# Patient Record
Sex: FEMALE | Race: BLACK OR AFRICAN AMERICAN | NOT HISPANIC OR LATINO | Employment: OTHER | URBAN - METROPOLITAN AREA
[De-identification: names, ages, dates, MRNs, and addresses within clinical notes are randomized per-mention and may not be internally consistent; named-entity substitution may affect disease eponyms.]

---

## 2017-01-17 ENCOUNTER — TRANSFERRED RECORDS (OUTPATIENT)
Dept: HEALTH INFORMATION MANAGEMENT | Facility: CLINIC | Age: 82
End: 2017-01-17

## 2017-03-24 DIAGNOSIS — I10 HYPERTENSION GOAL BP (BLOOD PRESSURE) < 140/90: ICD-10-CM

## 2017-03-24 RX ORDER — LISINOPRIL 10 MG/1
10 TABLET ORAL DAILY
Qty: 30 TABLET | Refills: 0 | Status: SHIPPED | OUTPATIENT
Start: 2017-03-24 | End: 2017-04-23

## 2017-03-24 NOTE — TELEPHONE ENCOUNTER
Reason for Call:  Medication or medication refill:    Do you use a [facility]?  Name of the pharmacy and phone number for the current request:  [place] [id number]    Name of the medication requested: lisinopril (PRINIVIL,ZESTRIL) 10 MG tablet    Other request: Pt is completely out of this Rx and needs it right away. States that she has been talking to the pharmacy about the refill, last office visit was 02/16/2016. Please give her a call if you have any questions, thank you.     Can we leave a detailed message on this number? YES    Phone number patient can be reached at: Home number on file [phone] (home)    Best Time: anytime    Call taken on 3/24/2017 at 3:55 PM by Jeni Mane

## 2017-04-12 DIAGNOSIS — I10 HYPERTENSION GOAL BP (BLOOD PRESSURE) < 140/90: ICD-10-CM

## 2017-04-12 NOTE — TELEPHONE ENCOUNTER
The patient will run out of her medication 4/16/17.  The patient is scheduled to see Dr. Ramirez 4/25/17.

## 2017-04-13 DIAGNOSIS — I10 HYPERTENSION GOAL BP (BLOOD PRESSURE) < 140/90: ICD-10-CM

## 2017-04-13 NOTE — TELEPHONE ENCOUNTER
hydrochlorothiazide (HYDRODIURIL) 25 MG tablet      Last Written Prescription Date: 3/16/17  Last Fill Quantity: 30, # refills: 0  Last Office Visit with G, P or [facility] prescribing provider: 2/23/16  Next 5 appointments (look out 90 days)     Apr 25, 2017  1:20 PM CDT   PHYSICAL with Edelmira Ramirez MD   [facility]    [facility] [address] [place] [id number] [phone]                   Potassium   Date Value Ref Range Status   02/23/2016 3.5 3.4 - 5.3 mmol/L Final     Creatinine   Date Value Ref Range Status   02/23/2016 0.86 0.52 - 1.04 mg/dL Final     BP Readings from Last 3 Encounters:   02/23/16 140/82   02/19/15 116/62   02/05/15 126/74

## 2017-04-17 RX ORDER — HYDROCHLOROTHIAZIDE 25 MG/1
25 TABLET ORAL DAILY
Qty: 30 TABLET | Refills: 0 | Status: SHIPPED | OUTPATIENT
Start: 2017-04-17 | End: 2017-04-18

## 2017-04-18 RX ORDER — HYDROCHLOROTHIAZIDE 25 MG/1
TABLET ORAL
Qty: 90 TABLET | Refills: 2 | Status: SHIPPED | OUTPATIENT
Start: 2017-04-18 | End: 2017-04-25

## 2017-04-18 NOTE — TELEPHONE ENCOUNTER
Prescription approved per [facility] Refill [facility].  GAETANO Valenzuela

## 2017-04-23 DIAGNOSIS — I10 HYPERTENSION GOAL BP (BLOOD PRESSURE) < 140/90: ICD-10-CM

## 2017-04-24 NOTE — TELEPHONE ENCOUNTER
Lisinopril      Last Written Prescription Date: 3/24/17  Last Fill Quantity: 30, # refills: 0  Last Office Visit with [facility], P or [facility] prescribing provider: 2/23/16  Next 5 appointments (look out 90 days)     Apr 25, 2017  1:20 PM CDT   PHYSICAL with Edelmira Ramirez MD   [facility] ([facility])    [address] [place] [id number] [phone]               RN unable to refill, patient has already received julia refill for 30 days  Has appontment with provider scheduled for tomorrow.  Nicole Fernández RN    Potassium   Date Value Ref Range Status   02/23/2016 3.5 3.4 - 5.3 mmol/L Final     Creatinine   Date Value Ref Range Status   02/23/2016 0.86 0.52 - 1.04 mg/dL Final     BP Readings from Last 3 Encounters:   02/23/16 140/82   02/19/15 116/62   02/05/15 126/74

## 2017-04-25 ENCOUNTER — OFFICE VISIT (OUTPATIENT)
Dept: FAMILY MEDICINE | Facility: CLINIC | Age: 82
End: 2017-04-25
Payer: COMMERCIAL

## 2017-04-25 VITALS
RESPIRATION RATE: 14 BRPM | OXYGEN SATURATION: 98 % | SYSTOLIC BLOOD PRESSURE: 136 MMHG | HEIGHT: 69 IN | DIASTOLIC BLOOD PRESSURE: 84 MMHG | BODY MASS INDEX: 30.96 KG/M2 | HEART RATE: 71 BPM | TEMPERATURE: 99.1 F | WEIGHT: 209 LBS

## 2017-04-25 DIAGNOSIS — R73.02 IMPAIRED GLUCOSE TOLERANCE: ICD-10-CM

## 2017-04-25 DIAGNOSIS — I10 HYPERTENSION GOAL BP (BLOOD PRESSURE) < 140/90: ICD-10-CM

## 2017-04-25 DIAGNOSIS — Z23 ENCOUNTER FOR IMMUNIZATION: ICD-10-CM

## 2017-04-25 DIAGNOSIS — E78.5 HYPERLIPIDEMIA LDL GOAL <130: ICD-10-CM

## 2017-04-25 DIAGNOSIS — Z12.31 ENCOUNTER FOR SCREENING MAMMOGRAM FOR BREAST CANCER: ICD-10-CM

## 2017-04-25 DIAGNOSIS — Z00.00 MEDICARE ANNUAL WELLNESS VISIT, SUBSEQUENT: Primary | ICD-10-CM

## 2017-04-25 LAB — HBA1C MFR BLD: 5.2 % (ref 4.3–6)

## 2017-04-25 PROCEDURE — 99397 PER PM REEVAL EST PAT 65+ YR: CPT | Mod: 25 | Performed by: FAMILY MEDICINE

## 2017-04-25 PROCEDURE — 83036 HEMOGLOBIN GLYCOSYLATED A1C: CPT | Performed by: FAMILY MEDICINE

## 2017-04-25 PROCEDURE — 80048 BASIC METABOLIC PNL TOTAL CA: CPT | Performed by: FAMILY MEDICINE

## 2017-04-25 PROCEDURE — 80061 LIPID PANEL: CPT | Performed by: FAMILY MEDICINE

## 2017-04-25 PROCEDURE — 90471 IMMUNIZATION ADMIN: CPT | Performed by: FAMILY MEDICINE

## 2017-04-25 PROCEDURE — 90736 HZV VACCINE LIVE SUBQ: CPT | Performed by: FAMILY MEDICINE

## 2017-04-25 PROCEDURE — 36415 COLL VENOUS BLD VENIPUNCTURE: CPT | Performed by: FAMILY MEDICINE

## 2017-04-25 RX ORDER — HYDROCHLOROTHIAZIDE 25 MG/1
25 TABLET ORAL DAILY
Qty: 90 TABLET | Refills: 3 | Status: SHIPPED | OUTPATIENT
Start: 2017-04-25 | End: 2017-07-11 | Stop reason: DRUGHIGH

## 2017-04-25 RX ORDER — LISINOPRIL 10 MG/1
TABLET ORAL
Qty: 30 TABLET | Refills: 0 | Status: SHIPPED | OUTPATIENT
Start: 2017-04-25 | End: 2017-04-25

## 2017-04-25 RX ORDER — LISINOPRIL 10 MG/1
10 TABLET ORAL DAILY
Qty: 90 TABLET | Refills: 3 | Status: SHIPPED | OUTPATIENT
Start: 2017-04-25 | End: 2017-07-11 | Stop reason: DRUGHIGH

## 2017-04-25 NOTE — NURSING NOTE
"Chief Complaint   Patient presents with     Medicare Visit       Initial /78  Pulse 71  Temp 99.1  F (37.3  C) (Oral)  Resp 14  Ht 5' 9.25\" (1.759 m)  Wt 209 lb (94.8 kg)  LMP 07/05/1990  SpO2 98%  BMI 30.64 kg/m2 Estimated body mass index is 30.64 kg/(m^2) as calculated from the following:    Height as of this encounter: 5' 9.25\" (1.759 m).    Weight as of this encounter: 209 lb (94.8 kg).  Medication Reconciliation: complete     Lisbeth Verdin MA     "

## 2017-04-25 NOTE — PATIENT INSTRUCTIONS
1. Schedule a mammogram.     Preventive Health Recommendations    Female Ages 65 +    Yearly exam:     See your health care provider every year in order to  o Review health changes.   o Discuss preventive care.    o Review your medicines if your doctor has prescribed any.      You no longer need a yearly Pap test unless you've had an abnormal Pap test in the past 10 years. If you have vaginal symptoms, such as bleeding or discharge, be sure to talk with your provider about a Pap test.      Every 1 to 2 years, have a mammogram.  If you are over 69, talk with your health care provider about whether or not you want to continue having screening mammograms.      Every 10 years, have a colonoscopy. Or, have a yearly FIT test (stool test). These exams will check for colon cancer.       Have a cholesterol test every 5 years, or more often if your doctor advises it.       Have a diabetes test (fasting glucose) every three years. If you are at risk for diabetes, you should have this test more often.       At age 65, have a bone density scan (DEXA) to check for osteoporosis (brittle bone disease).    Shots:    Get a flu shot each year.    Get a tetanus shot every 10 years.    Talk to your doctor about your pneumonia vaccines. There are now two you should receive - Pneumovax (PPSV 23) and Prevnar (PCV 13).    Talk to your doctor about the shingles vaccine.    Talk to your doctor about the hepatitis B vaccine.    Nutrition:     Eat at least 5 servings of fruits and vegetables each day.      Eat whole-grain bread, whole-wheat pasta and brown rice instead of white grains and rice.      Talk to your provider about Calcium and Vitamin D.     Lifestyle    Exercise at least 150 minutes a week (30 minutes a day, 5 days a week). This will help you control your weight and prevent disease.      Limit alcohol to one drink per day.      No smoking.       Wear sunscreen to prevent skin cancer.       See your dentist twice a year for an exam  and cleaning.      See your eye doctor every 1 to 2 years to screen for conditions such as glaucoma, macular degeneration and cataracts.  Preventive Health Recommendations    Female Ages 65 +    Yearly exam:   See your health care provider every year in order to  Review health changes.   Discuss preventive care.    Review your medicines if your doctor has prescribed any.    You no longer need a yearly Pap test unless you've had an abnormal Pap test in the past 10 years. If you have vaginal symptoms, such as bleeding or discharge, be sure to talk with your provider about a Pap test.    Every 1 to 2 years, have a mammogram.  If you are over 69, talk with your health care provider about whether or not you want to continue having screening mammograms.    Every 10 years, have a colonoscopy. Or, have a yearly FIT test (stool test). These exams will check for colon cancer.     Have a cholesterol test every 5 years, or more often if your doctor advises it.     Have a diabetes test (fasting glucose) every three years. If you are at risk for diabetes, you should have this test more often.     At age 65, have a bone density scan (DEXA) to check for osteoporosis (brittle bone disease).    Shots:  Get a flu shot each year.  Get a tetanus shot every 10 years.  Talk to your doctor about your pneumonia vaccines. There are now two you should receive - Pneumovax (PPSV 23) and Prevnar (PCV 13).  Talk to your doctor about the shingles vaccine.  Talk to your doctor about the hepatitis B vaccine.    Nutrition:   Eat at least 5 servings of fruits and vegetables each day.    Eat whole-grain bread, whole-wheat pasta and brown rice instead of white grains and rice.    Talk to your provider about Calcium and Vitamin D.     Lifestyle  Exercise at least 150 minutes a week (30 minutes a day, 5 days a week). This will help you control your weight and prevent disease.    Limit alcohol to one drink per day.    No smoking.     Wear sunscreen to  prevent skin cancer.     See your dentist twice a year for an exam and cleaning.    See your eye doctor every 1 to 2 years to screen for conditions such as glaucoma, macular degeneration and cataracts.

## 2017-04-25 NOTE — PROGRESS NOTES
SUBJECTIVE:                                                            Mya Mack is a 81 year old female who presents for Preventive Visit.  Are you in the first 12 months of your Medicare Part B coverage?  No    Healthy Habits:    Do you get at least three servings of calcium containing foods daily (dairy, green leafy vegetables, etc.)? yes    Amount of exercise or daily activities, outside of work: 2 day(s) per week    Problems taking medications regularly No    Medication side effects: No    Have you had an eye exam in the past two years? yes    Do you see a dentist twice per year? yes    Do you have sleep apnea, excessive snoring or daytime drowsiness?no    COGNITIVE SCREEN  1) Repeat 3 items (Banana, Sunrise, Chair)    2) Clock draw: NORMAL  3) 3 item recall: Recalls 1 object   Results: NORMAL clock, 1-2 items recalled: COGNITIVE IMPAIRMENT LESS LIKELY  Mini-CogTM Copyright S Ramon. Licensed by the author for use in [facility]; reprinted with permission (shankar@[facility]). All rights reserved.        She is having difficulties with short term memory.  Patient is writing things down on a daily basis so she remembers.     She continues to see ortho and might be looking at knee replacement in the future. She denies leg pain and is doing PT like exercises at home twice a week to help strengthen and delay surgery.      Reviewed and updated as needed this visit by clinical staff  Reviewed and updated as needed this visit by Provider    Social History   Substance Use Topics     Smoking status: Never Smoker     Smokeless tobacco: Never Used     Alcohol use Yes      Comment: 0-1 drinks per week     The patient does not drink >3 drinks per day nor >7 drinks per week.    Today's PHQ-2 Score:   PHQ-2 ( 1999 Pfizer) 2/5/2015 9/23/2014   Q1: Little interest or pleasure in doing things 0 0   Q2: Feeling down, depressed or hopeless 0 0   PHQ-2 Score 0 0     Do you feel safe in your environment - Yes    Do  you have a Health Care Directive?: No: Advance care planning reviewed with patient; information given to patient to review.    Current providers sharing in care for this patient include:   Patient Care Team:  Edelmira Ramirez MD as PCP - General (Family Practice)      Hearing impairment: No    Ability to successfully perform activities of daily living: Yes, no assistance needed     Fall risk:  Fallen 2 or more times in the past year?: No  Any fall with injury in the past year?: No    Home safety:  none identified    The following health maintenance items are reviewed in Epic and correct as of today:  Health Maintenance   Topic Date Due     BMP Q6 MOS (NO INBASKET)  08/23/2016     FALL RISK ASSESSMENT  02/23/2017     A1C Q1 YR (NO INBASKET)  02/23/2017     INFLUENZA VACCINE (SYSTEM ASSIGNED)  09/01/2017     ADVANCE DIRECTIVE PLANNING Q5 YRS (NO INBASKET)  12/28/2017     TETANUS IMMUNIZATION (SYSTEM ASSIGNED)  07/05/2020     LIPID SCREEN Q5 YR FEMALE (SYSTEM ASSIGNED)  02/23/2021     DEXA SCAN SCREENING (SYSTEM ASSIGNED)  Completed     PNEUMOCOCCAL  Completed     Pneumonia Vaccine: COMPLETED   Mammogram Screening: Patient over 75, has elected to continue with yearly mammograms.   History of abnormal Pap smear: NO - age 65 - see link Cervical Cytology Screening Guidelines    ROS:   ROS: 10 point ROS neg other than the symptoms noted above in the HPI.    This document serves as a record of the services and decisions personally performed and made by Edelmira Ramirez MD. It was created on his/her behalf by Kylee Guadalupe, trained medical scribe. The creation of this document is based the provider's statements to the medical scribes.    Scribe Kylee Guadalupe, April 25, 2017    BP Readings from Last 3 Encounters:   04/25/17 148/78   02/23/16 140/82   02/19/15 116/62    Wt Readings from Last 3 Encounters:   04/25/17 94.8 kg (209 lb)   02/23/16 102.3 kg (225 lb 8 oz)   02/19/15 108.4 kg (239 lb)        Patient Active Problem  List   Diagnosis     Obesity     Impaired glucose tolerance     Hypertension goal BP (blood pressure) < 140/90     Hyperlipidemia LDL goal <130     Advanced directives, counseling/discussion     Primary osteoarthritis of right knee     Past Surgical History:   Procedure Laterality Date     C NONSPECIFIC PROCEDURE  2003    flex sig     COLONOSCOPY  2010    due 2013     HYSTERECTOMY, PAP NO LONGER INDICATED       SURGICAL HISTORY OF -       CARMENZA       Social History   Substance Use Topics     Smoking status: Never Smoker     Smokeless tobacco: Never Used     Alcohol use Yes      Comment: 0-1 drinks per week     Family History   Problem Relation Age of Onset     DIABETES Father      type 2         Current Outpatient Prescriptions   Medication Sig Dispense Refill     lisinopril (PRINIVIL/ZESTRIL) 10 MG tablet Take 1 tablet (10 mg) by mouth daily 90 tablet 3     hydrochlorothiazide (HYDRODIURIL) 25 MG tablet Take 1 tablet (25 mg) by mouth daily 90 tablet 3     VITAMIN B-6 100 MG OR TABS 1 TABLET DAILY       CALCIUM 600 + D 600-200 MG-UNIT OR TABS 1 daily 3 MONTHS 1 YEAR     [DISCONTINUED] lisinopril (PRINIVIL/ZESTRIL) 10 MG tablet TAKE ONE TABLET BY MOUTH ONE TIME DAILY  30 tablet 0     [DISCONTINUED] hydrochlorothiazide (HYDRODIURIL) 25 MG tablet TAKE ONE TABLET BY MOUTH ONE TIME DAILY  90 tablet 2     Allergies   Allergen Reactions     Nkda [No Known Drug Allergies]      Recent Labs   Lab Test  02/23/16   1303  02/19/15   1150  02/05/15   1340   01/10/14   1303   12/28/12   1119  06/05/12   1103   07/05/10   1129   A1C  6.0   --    --    --   5.9   --   6.0   --    < >  5.8   LDL  94   --   97   --   103   --   115   --    < >  114   HDL  70   --   65   --   51   --   55   --    < >  51   TRIG  90   --   86   --   108   --   84   --    < >  96   ALT   --    --    --    --   25   --    --   <6   --   12   CR  0.86  0.92  0.84   < >  0.92   < >   --   0.77   < >  0.90   GFRESTIMATED  64  59*  66   < >  59*   < >   --   73  "  < >  62   GFRESTBLACK  77  71  79   < >  72   < >   --   89   < >  74   POTASSIUM  3.5  3.4  3.4   < >  3.5   < >   --   3.7   < >  3.8   TSH   --    --    --    --    --    --    --    --    --   1.52    < > = values in this interval not displayed.      OBJECTIVE:                                                            /78  Pulse 71  Temp 99.1  F (37.3  C) (Oral)  Resp 14  Ht 1.759 m (5' 9.25\")  Wt 94.8 kg (209 lb)  LMP 07/05/1990  SpO2 98%  BMI 30.64 kg/m2 Estimated body mass index is 30.64 kg/(m^2) as calculated from the following:    Height as of this encounter: 1.759 m (5' 9.25\").    Weight as of this encounter: 94.8 kg (209 lb).  EXAM:   GENERAL APPEARANCE: healthy, alert and no distress  EYES: Eyes grossly normal to inspection, PERRL and conjunctivae and sclerae normal  HENT: ear canals and TM's normal, nose and mouth without ulcers or lesions, oropharynx clear and oral mucous membranes moist  NECK: no adenopathy, no asymmetry, masses, or scars and thyroid normal to palpation  RESP: lungs clear to auscultation - no rales, rhonchi or wheezes  BREAST: normal without masses, tenderness or nipple discharge and no palpable axillary masses or adenopathy  CV: regular rate and rhythm, normal S1 S2, no S3 or S4, no murmur, click or rub, no peripheral edema and peripheral pulses strong  ABDOMEN: soft, nontender, no hepatosplenomegaly, no masses and bowel sounds normal  MS: no musculoskeletal defects are noted and gait is age appropriate without ataxia  SKIN: no suspicious lesions or rashes  NEURO: Normal strength and tone, sensory exam grossly normal, mentation intact and speech normal  PSYCH: mentation appears normal and affect normal/bright    ASSESSMENT / PLAN:                                                            1. Medicare annual wellness visit, subsequent  Mammo- she will schedule, she wishes to continue mammograms.  PAP not indicated based on age.   Other health maintenance utd.   - *MA " "Screening Digital Bilateral; Future    2. Hypertension goal BP (blood pressure) < 140/90  Slightly elevated today. MA will recheck. She continues on lisinopril and HCTZ.  - BASIC METABOLIC PANEL  - lisinopril (PRINIVIL/ZESTRIL) 10 MG tablet; Take 1 tablet (10 mg) by mouth daily  Dispense: 90 tablet; Refill: 3  - hydrochlorothiazide (HYDRODIURIL) 25 MG tablet; Take 1 tablet (25 mg) by mouth daily  Dispense: 90 tablet; Refill: 3    3. Hyperlipidemia LDL goal <130  Stable. Fasting labs today.  - Lipid Profile with reflex to direct LDL    4. Impaired glucose tolerance  Her A1C last year was within prediabetic range. Will recheck her A1C today.  - HEMOGLOBIN A1C    5. Encounter for immunization    - ZOSTER VACC LIVE SUBQ NJX      End of Life Planning:  Patient currently has an advanced directive: No.  I have verified the patient's ablity to prepare an advanced directive/make health care decisions.  Literature was provided to assist patient in preparing an advanced directive.    COUNSELING:  Reviewed preventive health counseling, as reflected in patient instructions  Estimated body mass index is 30.64 kg/(m^2) as calculated from the following:    Height as of this encounter: 1.759 m (5' 9.25\").    Weight as of this encounter: 94.8 kg (209 lb).  Weight management plan: diet and exercise   reports that she has never smoked. She has never used smokeless tobacco.    Appropriate preventive services were discussed with this patient, including applicable screening as appropriate for cardiovascular disease, diabetes, osteopenia/osteoporosis, and glaucoma.  As appropriate for age/gender, discussed screening for colorectal cancer, prostate cancer, breast cancer, and cervical cancer. Checklist reviewing preventive services available has been given to the patient.    Reviewed patients plan of care and provided an AVS. The Basic Care Plan (routine screening as documented in Health Maintenance) for Mya meets the Care Plan " requirement. This Care Plan has been established and reviewed with the Patient.    Counseling Resources:  ATP IV Guidelines  Pooled Cohorts Equation Calculator  Breast Cancer Risk Calculator  FRAX Risk Assessment  ICSI Preventive Guidelines  Dietary Guidelines for Americans, 2010  USDA's MyPlate  ASA Prophylaxis  Lung CA Screening    The information in this document, created by the medical scribe for me, accurately reflects the services I personally performed and the decisions made by me. I have reviewed and approved this document for accuracy. 04/25/17    Edelmira Ramirez MD  [facility]

## 2017-04-25 NOTE — MR AVS SNAPSHOT
After Visit Summary   4/25/2017    yMa Mack    MRN: [id number]           Patient Information     Date Of Birth          1935        Visit Information        Provider Department      4/25/2017 1:20 PM Edelmira Ramirez MD [facility]        Today's Diagnoses     Medicare annual wellness visit, subsequent    -  1    Hypertension goal BP (blood pressure) < 140/90        Hyperlipidemia LDL goal <130        Impaired glucose tolerance        Encounter for immunization        Encounter for screening mammogram for breast cancer          Care Instructions    1. Schedule a mammogram.     Preventive Health Recommendations    Female Ages 65 +    Yearly exam:     See your health care provider every year in order to  o Review health changes.   o Discuss preventive care.    o Review your medicines if your doctor has prescribed any.      You no longer need a yearly Pap test unless you've had an abnormal Pap test in the past 10 years. If you have vaginal symptoms, such as bleeding or discharge, be sure to talk with your provider about a Pap test.      Every 1 to 2 years, have a mammogram.  If you are over 69, talk with your health care provider about whether or not you want to continue having screening mammograms.      Every 10 years, have a colonoscopy. Or, have a yearly FIT test (stool test). These exams will check for colon cancer.       Have a cholesterol test every 5 years, or more often if your doctor advises it.       Have a diabetes test (fasting glucose) every three years. If you are at risk for diabetes, you should have this test more often.       At age 65, have a bone density scan (DEXA) to check for osteoporosis (brittle bone disease).    Shots:    Get a flu shot each year.    Get a tetanus shot every 10 years.    Talk to your doctor about your pneumonia vaccines. There are now two you should receive - Pneumovax (PPSV 23) and Prevnar (PCV 13).    Talk to your doctor about the  shingles vaccine.    Talk to your doctor about the hepatitis B vaccine.    Nutrition:     Eat at least 5 servings of fruits and vegetables each day.      Eat whole-grain bread, whole-wheat pasta and brown rice instead of white grains and rice.      Talk to your provider about Calcium and Vitamin D.     Lifestyle    Exercise at least 150 minutes a week (30 minutes a day, 5 days a week). This will help you control your weight and prevent disease.      Limit alcohol to one drink per day.      No smoking.       Wear sunscreen to prevent skin cancer.       See your dentist twice a year for an exam and cleaning.      See your eye doctor every 1 to 2 years to screen for conditions such as glaucoma, macular degeneration and cataracts.  Preventive Health Recommendations    Female Ages 65 +    Yearly exam:   See your health care provider every year in order to  Review health changes.   Discuss preventive care.    Review your medicines if your doctor has prescribed any.    You no longer need a yearly Pap test unless you've had an abnormal Pap test in the past 10 years. If you have vaginal symptoms, such as bleeding or discharge, be sure to talk with your provider about a Pap test.    Every 1 to 2 years, have a mammogram.  If you are over 69, talk with your health care provider about whether or not you want to continue having screening mammograms.    Every 10 years, have a colonoscopy. Or, have a yearly FIT test (stool test). These exams will check for colon cancer.     Have a cholesterol test every 5 years, or more often if your doctor advises it.     Have a diabetes test (fasting glucose) every three years. If you are at risk for diabetes, you should have this test more often.     At age 65, have a bone density scan (DEXA) to check for osteoporosis (brittle bone disease).    Shots:  Get a flu shot each year.  Get a tetanus shot every 10 years.  Talk to your doctor about your pneumonia vaccines. There are now two you should  receive - Pneumovax (PPSV 23) and Prevnar (PCV 13).  Talk to your doctor about the shingles vaccine.  Talk to your doctor about the hepatitis B vaccine.    Nutrition:   Eat at least 5 servings of fruits and vegetables each day.    Eat whole-grain bread, whole-wheat pasta and brown rice instead of white grains and rice.    Talk to your provider about Calcium and Vitamin D.     Lifestyle  Exercise at least 150 minutes a week (30 minutes a day, 5 days a week). This will help you control your weight and prevent disease.    Limit alcohol to one drink per day.    No smoking.     Wear sunscreen to prevent skin cancer.     See your dentist twice a year for an exam and cleaning.    See your eye doctor every 1 to 2 years to screen for conditions such as glaucoma, macular degeneration and cataracts.        Follow-ups after your visit        Future tests that were ordered for you today     Open Future Orders        Priority Expected Expires Ordered    *MA Screening Digital Bilateral Routine  4/25/2018 4/25/2017            Who to contact     If you have questions or need follow up information about today's clinic visit or your schedule please contact [facility] directly at [phone].  Normal or non-critical lab and imaging results will be communicated to you by [employer], letter or phone within 4 business days after the clinic has received the results. If you do not hear from us within 7 days, please contact the clinic through [employer] or phone. If you have a critical or abnormal lab result, we will notify you by phone as soon as possible.  Submit refill requests through [employer] or call your pharmacy and they will forward the refill request to us. Please allow 3 business days for your refill to be completed.          Additional Information About Your Visit        [employer] Information     [employer] lets you send messages to your doctor, view your test results, renew your prescriptions, schedule appointments and more.  "To sign up, go to [email or web address] . Click on \"Log in\" on the left side of the screen, which will take you to the Welcome page. Then click on \"Sign up Now\" on the right side of the page.     You will be asked to enter the access code listed below, as well as some personal information. Please follow the directions to create your username and password.     Your access code is: [email or web address]  Expires: 2017  1:55 PM     Your access code will  in 90 days. If you need help or a new code, please call your [place] clinic or [phone].        Care EveryWhere ID     This is your Care EveryWhere ID. This could be used by other organizations to access your [place] medical records  [id number]        Your Vitals Were     Pulse Temperature Respirations Height Last Period Pulse Oximetry    71 99.1  F (37.3  C) (Oral) 14 5' 9.25\" (1.759 m) 1990 98%    BMI (Body Mass Index)                   30.64 kg/m2            Blood Pressure from Last 3 Encounters:   17 148/78   16 140/82   02/19/15 116/62    Weight from Last 3 Encounters:   17 209 lb (94.8 kg)   16 225 lb 8 oz (102.3 kg)   02/19/15 239 lb (108.4 kg)              We Performed the Following     BASIC METABOLIC PANEL     HEMOGLOBIN A1C     Lipid Profile with reflex to direct LDL     ZOSTER VACC LIVE SUBQ NJX          Today's Medication Changes          These changes are accurate as of: 17  1:55 PM.  If you have any questions, ask your nurse or doctor.               These medicines have changed or have updated prescriptions.        Dose/Directions    hydrochlorothiazide 25 MG tablet   Commonly known as:  HYDRODIURIL   This may have changed:  See the new instructions.   Used for:  Hypertension goal BP (blood pressure) < 140/90   Changed by:  Edelmira Ramirez MD        Dose:  25 mg   Take 1 tablet (25 mg) by mouth daily   Quantity:  90 tablet   Refills:  3       lisinopril 10 MG tablet   Commonly known as:  PRINIVIL/ZESTRIL "   This may have changed:  See the new instructions.   Used for:  Hypertension goal BP (blood pressure) < 140/90   Changed by:  Edelmira Ramirez MD        Dose:  10 mg   Take 1 tablet (10 mg) by mouth daily   Quantity:  90 tablet   Refills:  3            Where to get your medicines      These medications were sent to [facility] - [facility], [place] - [address]  [address], [facility] [place] [address]     Phone:  [phone]     hydrochlorothiazide 25 MG tablet    lisinopril 10 MG tablet                Primary Care Provider Office Phone # Fax #    Edelmira Ramirez [phone]       [facility] [address]  [facility] [address]        Thank you!     Thank you for choosing [facility]  for your care. Our goal is always to provide you with excellent care. Hearing back from our patients is one way we can continue to improve our services. Please take a few minutes to complete the written survey that you may receive in the mail after your visit with us. Thank you!             Your Updated Medication List - Protect others around you: Learn how to safely use, store and throw away your medicines at www.disposemymeds.org.          This list is accurate as of: 4/25/17  1:55 PM.  Always use your most recent med list.                   Brand Name Dispense Instructions for use    CALCIUM 600 + D 600-200 MG-UNIT Tabs     3 MONTHS    1 daily       hydrochlorothiazide 25 MG tablet    HYDRODIURIL    90 tablet    Take 1 tablet (25 mg) by mouth daily       lisinopril 10 MG tablet    PRINIVIL/ZESTRIL    90 tablet    Take 1 tablet (10 mg) by mouth daily       pyridoxine 100 MG tablet    VITAMIN B-6     1 TABLET DAILY

## 2017-04-26 LAB
ANION GAP SERPL CALCULATED.3IONS-SCNC: 10 MMOL/L (ref 3–14)
BUN SERPL-MCNC: 11 MG/DL (ref 7–30)
CALCIUM SERPL-MCNC: 9.3 MG/DL (ref 8.5–10.1)
CHLORIDE SERPL-SCNC: 107 MMOL/L (ref 94–109)
CHOLEST SERPL-MCNC: 183 MG/DL
CO2 SERPL-SCNC: 28 MMOL/L (ref 20–32)
CREAT SERPL-MCNC: 0.82 MG/DL (ref 0.52–1.04)
GFR SERPL CREATININE-BSD FRML MDRD: 67 ML/MIN/1.7M2
GLUCOSE SERPL-MCNC: 101 MG/DL (ref 70–99)
HDLC SERPL-MCNC: 88 MG/DL
LDLC SERPL CALC-MCNC: 79 MG/DL
NONHDLC SERPL-MCNC: 95 MG/DL
POTASSIUM SERPL-SCNC: 3.3 MMOL/L (ref 3.4–5.3)
SODIUM SERPL-SCNC: 145 MMOL/L (ref 133–144)
TRIGL SERPL-MCNC: 80 MG/DL

## 2017-05-09 ENCOUNTER — TELEPHONE (OUTPATIENT)
Dept: FAMILY MEDICINE | Facility: CLINIC | Age: 82
End: 2017-05-09

## 2017-05-09 DIAGNOSIS — E87.0 SERUM SODIUM ELEVATED: Primary | ICD-10-CM

## 2017-05-09 DIAGNOSIS — E87.6 LOW SERUM POTASSIUM: ICD-10-CM

## 2017-05-09 NOTE — TELEPHONE ENCOUNTER
Left message on machine to call back  Ask to speak to an RN, let them know it's a return call.  Leave a number and time that you can be reached.   Nicole Fernández RN

## 2017-05-09 NOTE — TELEPHONE ENCOUNTER
Patient informed as below.  Asked for a copy of the labs to be sent to her home address (this was done)  Patient was informed that she could get blood/labs drawn at the [place] site if this is more convenient for her.  Nicole Fernández RN

## 2017-05-09 NOTE — TELEPHONE ENCOUNTER
RN -- please call Mya regarding her results. Her potassium was a little bit low and her sodium was a little bit high. While they are not very clinically concerning at this time, they do need to be rechecked to make sure they are back in the normal range and not getting worse. Please ask her to schedule a non-fasting lab visit within the next week. I placed an order. Her liver and kidney function were stable and her lipids looked great. Please also thank her for the kind note that she gave me (telling me she appreciated the time I took with her at the time of her preventive exam). Edelmira Ramirez M.D.          Results for orders placed or performed in visit on 04/25/17   BASIC METABOLIC PANEL   Result Value Ref Range    Sodium 145 (H) 133 - 144 mmol/L    Potassium 3.3 (L) 3.4 - 5.3 mmol/L    Chloride 107 94 - 109 mmol/L    Carbon Dioxide 28 20 - 32 mmol/L    Anion Gap 10 3 - 14 mmol/L    Glucose 101 (H) 70 - 99 mg/dL    Urea Nitrogen 11 7 - 30 mg/dL    Creatinine 0.82 0.52 - 1.04 mg/dL    GFR Estimate 67 >60 mL/min/1.7m2    GFR Estimate If Black 81 >60 mL/min/1.7m2    Calcium 9.3 8.5 - 10.1 mg/dL   HEMOGLOBIN A1C   Result Value Ref Range    Hemoglobin A1C 5.2 4.3 - 6.0 %   Lipid Profile with reflex to direct LDL   Result Value Ref Range    Cholesterol 183 <200 mg/dL    Triglycerides 80 <150 mg/dL    HDL Cholesterol 88 >49 mg/dL    LDL Cholesterol Calculated 79 <100 mg/dL    Non HDL Cholesterol 95 <130 mg/dL

## 2017-06-06 DIAGNOSIS — E87.6 LOW SERUM POTASSIUM: ICD-10-CM

## 2017-06-06 DIAGNOSIS — E87.0 SERUM SODIUM ELEVATED: ICD-10-CM

## 2017-06-06 LAB
ANION GAP SERPL CALCULATED.3IONS-SCNC: 10 MMOL/L (ref 3–14)
BUN SERPL-MCNC: 10 MG/DL (ref 7–30)
CALCIUM SERPL-MCNC: 9 MG/DL (ref 8.5–10.1)
CHLORIDE SERPL-SCNC: 105 MMOL/L (ref 94–109)
CO2 SERPL-SCNC: 28 MMOL/L (ref 20–32)
CREAT SERPL-MCNC: 0.84 MG/DL (ref 0.52–1.04)
GFR SERPL CREATININE-BSD FRML MDRD: 65 ML/MIN/1.7M2
GLUCOSE SERPL-MCNC: 98 MG/DL (ref 70–99)
POTASSIUM SERPL-SCNC: 3.3 MMOL/L (ref 3.4–5.3)
SODIUM SERPL-SCNC: 143 MMOL/L (ref 133–144)

## 2017-06-06 PROCEDURE — 80048 BASIC METABOLIC PNL TOTAL CA: CPT | Performed by: FAMILY MEDICINE

## 2017-06-06 PROCEDURE — 36415 COLL VENOUS BLD VENIPUNCTURE: CPT | Performed by: FAMILY MEDICINE

## 2017-06-08 DIAGNOSIS — I10 HYPERTENSION GOAL BP (BLOOD PRESSURE) < 140/90: Primary | ICD-10-CM

## 2017-06-08 RX ORDER — LISINOPRIL 20 MG/1
20 TABLET ORAL DAILY
Qty: 90 TABLET | Refills: 0 | Status: SHIPPED | OUTPATIENT
Start: 2017-06-08 | End: 2017-10-24

## 2017-06-08 NOTE — TELEPHONE ENCOUNTER
Called patient and reviewed message per below from Dr. Ramirez.    Reviewed potassium rich foods.    Patient stated she has 10 mg Lisinopril tablets and just refilled this dose so will take 2 tablets for total of 20 mg.    Patient stated she will check with her insurance company to see if a BMP would be covered.    Patient asked for clarification multiple times regarding directions and had multiple questions throughout phone conversation.    Phone conversation lasted 23 minutes.    Patient would like Lisinopril 20 mg tab profiled until she goes through 10 mg tabs.    Dr Ramirez-Please sign if agree.    Thank you!  NEREIDA Chow, ELLENN, RN

## 2017-06-08 NOTE — TELEPHONE ENCOUNTER
RN -- please call Mya and let her know that her potassium is still a little bit low. I recommend increasing her lisinopril to 20mg daily. This can help preserve potassium level. Hydrochlorothiazide can reduce potassium. The next step would be to reduce or stop the hydrochlorothiazide if her potassium is still low and start a different kind of blood pressure medication (likely amlodipine). Her blood pressure was on the high end of the normal range, so I think the increase in lisinopril will also be beneficial to her blood pressure. She can also increase the potassium in her diet.  I have pended an order. Please schedule her for a visit in 2-3 weeks to recheck BP and blood test for BMP. Edelmira Ramirez M.D.

## 2017-06-15 NOTE — TELEPHONE ENCOUNTER
Return call to patient - discussed the following  1. Patient to call insurance regarding coverage  2. Gave associated diagnosis code for insurance reference  3. Encouraged to check if previous labs were covered under plan  4. Reviewed medical necessity of lab orders    Patient verbalized understanding - no further questions at this time    Milan Meeks RN

## 2017-06-15 NOTE — TELEPHONE ENCOUNTER
"Patient called regarding insurance coverage of BMP - she wants to know if she will be responsible for any out of pocket expenses and if there is a \"maxmimum\" amount of rechecks   1. She called [facility] they wouldn't advise on coverage - advised to contact Medicare and have RN call regarding coverage    Writer discussed with patient we do not call insurance for coverage - encouraged to call Medicare - can check with team about recommendations    Milan Meeks RN    "

## 2017-07-05 ENCOUNTER — TELEPHONE (OUTPATIENT)
Dept: FAMILY MEDICINE | Facility: CLINIC | Age: 82
End: 2017-07-05

## 2017-07-05 DIAGNOSIS — I10 HYPERTENSION GOAL BP (BLOOD PRESSURE) < 140/90: Primary | ICD-10-CM

## 2017-07-05 NOTE — TELEPHONE ENCOUNTER
Reason for Call: Request for an order or referral:    Order or referral being requested: Lab    Date needed: as soon as possible    Has the patient been seen by the PCP for this problem? YES    Additional comments: The patient is scheduled for a lab only and BP check 7/6/17. Please order appropriate labs.    Phone number Patient can be reached at:  Home number on file [phone] (home)    Best Time:      Can we leave a detailed message on this number?  YES    Call taken on 7/5/2017 at 10:53 AM by Shilpa Eddy

## 2017-07-06 ENCOUNTER — ALLIED HEALTH/NURSE VISIT (OUTPATIENT)
Dept: NURSING | Facility: CLINIC | Age: 82
End: 2017-07-06
Payer: COMMERCIAL

## 2017-07-06 VITALS — DIASTOLIC BLOOD PRESSURE: 70 MMHG | SYSTOLIC BLOOD PRESSURE: 134 MMHG

## 2017-07-06 DIAGNOSIS — I10 HYPERTENSION GOAL BP (BLOOD PRESSURE) < 140/90: Primary | ICD-10-CM

## 2017-07-06 DIAGNOSIS — I10 HYPERTENSION GOAL BP (BLOOD PRESSURE) < 140/90: ICD-10-CM

## 2017-07-06 LAB
ANION GAP SERPL CALCULATED.3IONS-SCNC: 7 MMOL/L (ref 3–14)
BUN SERPL-MCNC: 10 MG/DL (ref 7–30)
CALCIUM SERPL-MCNC: 8.8 MG/DL (ref 8.5–10.1)
CHLORIDE SERPL-SCNC: 106 MMOL/L (ref 94–109)
CO2 SERPL-SCNC: 28 MMOL/L (ref 20–32)
CREAT SERPL-MCNC: 0.8 MG/DL (ref 0.52–1.04)
GFR SERPL CREATININE-BSD FRML MDRD: 69 ML/MIN/1.7M2
GLUCOSE SERPL-MCNC: 95 MG/DL (ref 70–99)
POTASSIUM SERPL-SCNC: 3.3 MMOL/L (ref 3.4–5.3)
SODIUM SERPL-SCNC: 141 MMOL/L (ref 133–144)

## 2017-07-06 PROCEDURE — 36415 COLL VENOUS BLD VENIPUNCTURE: CPT | Performed by: FAMILY MEDICINE

## 2017-07-06 PROCEDURE — 80048 BASIC METABOLIC PNL TOTAL CA: CPT | Performed by: FAMILY MEDICINE

## 2017-07-06 PROCEDURE — 99207 ZZC NO CHARGE NURSE ONLY: CPT

## 2017-07-11 ENCOUNTER — TELEPHONE (OUTPATIENT)
Dept: FAMILY MEDICINE | Facility: CLINIC | Age: 82
End: 2017-07-11

## 2017-07-11 DIAGNOSIS — I10 HYPERTENSION GOAL BP (BLOOD PRESSURE) < 140/90: Primary | ICD-10-CM

## 2017-07-11 RX ORDER — HYDROCHLOROTHIAZIDE 12.5 MG/1
12.5 TABLET ORAL DAILY
Qty: 30 TABLET | Refills: 1 | Status: SHIPPED | OUTPATIENT
Start: 2017-07-11 | End: 2017-11-18

## 2017-07-11 NOTE — TELEPHONE ENCOUNTER
RN -- please call Mya. Her potassium is persistently low. The last time this was low, her lisinopril was increased. I would now like her to reduce her hctz dose to 12.5mg daily (she can cut her 25mg tablets in half if able or I can send a new prescription). We should recheck her potassium and blood pressure two weeks after this change is made. If potassium is still low, would stop the hctz altogether and have her follow up to discuss other medication options for blood pressure. Edelmira Ramirez M.D.          BP Readings from Last 3 Encounters:   07/06/17 134/70   04/25/17 136/84   02/23/16 140/82        Results for orders placed or performed in visit on 07/06/17   Basic metabolic panel  (Ca, Cl, CO2, Creat, Gluc, K, Na, BUN)   Result Value Ref Range    Sodium 141 133 - 144 mmol/L    Potassium 3.3 (L) 3.4 - 5.3 mmol/L    Chloride 106 94 - 109 mmol/L    Carbon Dioxide 28 20 - 32 mmol/L    Anion Gap 7 3 - 14 mmol/L    Glucose 95 70 - 99 mg/dL    Urea Nitrogen 10 7 - 30 mg/dL    Creatinine 0.80 0.52 - 1.04 mg/dL    GFR Estimate 69 >60 mL/min/1.7m2    GFR Estimate If Black 84 >60 mL/min/1.7m2    Calcium 8.8 8.5 - 10.1 mg/dL

## 2017-07-11 NOTE — TELEPHONE ENCOUNTER
Patient informed as below.  Did you want a BMP or just a potassium?  Patient is scheduled for lab only and MA only on 7/27/17.  Nicole Fernández RN

## 2017-07-14 ENCOUNTER — OFFICE VISIT (OUTPATIENT)
Dept: FAMILY MEDICINE | Facility: CLINIC | Age: 82
End: 2017-07-14
Payer: COMMERCIAL

## 2017-07-14 VITALS
BODY MASS INDEX: 32.14 KG/M2 | HEART RATE: 91 BPM | SYSTOLIC BLOOD PRESSURE: 125 MMHG | DIASTOLIC BLOOD PRESSURE: 74 MMHG | TEMPERATURE: 98.6 F | OXYGEN SATURATION: 98 % | HEIGHT: 69 IN | WEIGHT: 217 LBS | RESPIRATION RATE: 22 BRPM

## 2017-07-14 DIAGNOSIS — R21 RASH: Primary | ICD-10-CM

## 2017-07-14 PROCEDURE — 99213 OFFICE O/P EST LOW 20 MIN: CPT | Performed by: PHYSICIAN ASSISTANT

## 2017-07-14 RX ORDER — DIPHENHYDRAMINE HCL 25 MG
25-50 TABLET ORAL EVERY 6 HOURS PRN
Qty: 60 TABLET | Refills: 1 | Status: SHIPPED | OUTPATIENT
Start: 2017-07-14 | End: 2018-10-10

## 2017-07-14 RX ORDER — PREDNISONE 10 MG/1
TABLET ORAL
Qty: 30 TABLET | Refills: 0 | Status: SHIPPED | OUTPATIENT
Start: 2017-07-14 | End: 2018-10-10

## 2017-07-14 ASSESSMENT — PAIN SCALES - GENERAL: PAINLEVEL: NO PAIN (0)

## 2017-07-14 NOTE — PROGRESS NOTES
SUBJECTIVE:                                                    Mya Mack is a 81 year old female who presents to clinic today for the following health issues:      Rash  Onset: 2 days     Description:   Location: all over body   Character: round, blotchy, red  Itching (Pruritis): YES    Progression of Symptoms:  same and constant    Accompanying Signs & Symptoms:  Fever: no   Body aches or joint pain: no   Sore throat symptoms: no   Recent cold symptoms: no     History:   Previous similar rash: no     Precipitating factors:   Exposure to similar rash: no   New exposures: None and foods - ate a chalupa at [employer]    Recent travel: no     Alleviating factors:  Itch cream at home.                    Allergies   Allergen Reactions     Nkda [No Known Drug Allergies]        Past Medical History:   Diagnosis Date     Glaucoma     q6 [facility] eye visits     Hyperlipidemia LDL goal <130 7/5/2010     Hypertension goal BP (blood pressure) < 140/90      Impaired glucose tolerance 10/24/2008     (Problem list name updated by automated process. Provider to review and confirm.)     Obesity, unspecified      Primary osteoarthritis of right knee 2/23/2016         Current Outpatient Prescriptions on File Prior to Visit:  hydrochlorothiazide 12.5 MG TABS tablet Take 1 tablet (12.5 mg) by mouth daily   lisinopril (PRINIVIL/ZESTRIL) 20 MG tablet Take 1 tablet (20 mg) by mouth daily   VITAMIN B-6 100 MG OR TABS 1 TABLET DAILY   CALCIUM 600 + D 600-200 MG-UNIT OR TABS 1 daily     No current facility-administered medications on file prior to visit.     Social History   Substance Use Topics     Smoking status: Never Smoker     Smokeless tobacco: Never Used     Alcohol use Yes      Comment: 0-1 drinks per week       ROS:  General: negative for fever  SKIN: + as above  RESP no dyspnea  ENT but 7/12 did feel a little dysphagia    Physcial Exam:  /74 (BP Location: Left arm, Patient Position: Chair, Cuff Size: Adult Large)  Pulse  "91  Temp 98.6  F (37  C) (Oral)  Resp 22  Ht 5' 9\" (1.753 m)  Wt 217 lb (98.4 kg)  LMP 07/05/1990  SpO2 98%  BMI 32.05 kg/m2    GENERAL: alert, no acute distress  EYES: conjunctival clear  THROAT_ no redness or swelling  RESP: Regular breathing rate  NEURO: awake .  SKIN: macpap small red lesion on abd with signs of scratching, a few amc pap lesion on arms as well.    ASSESSMENT:    ICD-10-CM    1. Rash R21 predniSONE (DELTASONE) 10 MG tablet     diphenhydrAMINE (BENADRYL) 25 MG tablet     DERMATOLOGY REFERRAL       PLAN:  See today's orders.  Follow-up with primary clinic if not improving.  Advised about symptoms which might herald more serious problems.      "

## 2017-07-14 NOTE — PATIENT INSTRUCTIONS
Dermatitis (Non-Specific)  Dermatitis is an inflammation of the skin. The exact cause of your rash is not certain. However, this rash does not appear to be an infection or contagious illness. Taking care of the rash at home should help relieve your symptoms.  Home Care:    Keep the areas of rash clean by washing it daily. This also helps to keep the skin moist.    Use a neutral pH soap such as Dove or Lever 2000.    Apply a moisturizing lotion after bathing to prevent dry skin.     Avoid skin irritants (wool or silk clothing, grease, oils, some medicines, harsh soaps, and detergents). Wear absorbent, soft fabrics next to the skin rather than rough or scratchy materials.    Unless another medicine was prescribed, you may use Hydrocortisone cream (which you can get without a prescription) to reduce the inflammation.  Follow Up:  Make an appointment with your doctor in the next 1 to 2 weeks if your symptoms do not improve with the above measures.  Get Prompt Medical Attention  if any of the following occur:    Increasing area of redness or pain in the skin    Yellow crusts or drainage from the rash    Joint pain    New rash that appears in other areas of the body    Fever of 100.4 F (38 C) or higher, or as directed by your healthcare provider    [id number] [facility], [address] [place] [address], [place] [address]. All rights reserved. This information is not intended as a substitute for professional medical care. Always follow your healthcare professional's instructions.

## 2017-07-14 NOTE — MR AVS SNAPSHOT
After Visit Summary   7/14/2017    Mya Mack    MRN: [id number]           Patient Information     Date Of Birth          1935        Visit Information        Provider Department      7/14/2017 11:20 AM Oskar Braun PA [facility]        Today's Diagnoses     Rash    -  1      Care Instructions        Dermatitis (Non-Specific)  Dermatitis is an inflammation of the skin. The exact cause of your rash is not certain. However, this rash does not appear to be an infection or contagious illness. Taking care of the rash at home should help relieve your symptoms.  Home Care:    Keep the areas of rash clean by washing it daily. This also helps to keep the skin moist.    Use a neutral pH soap such as Dove or Lever 2000.    Apply a moisturizing lotion after bathing to prevent dry skin.     Avoid skin irritants (wool or silk clothing, grease, oils, some medicines, harsh soaps, and detergents). Wear absorbent, soft fabrics next to the skin rather than rough or scratchy materials.    Unless another medicine was prescribed, you may use Hydrocortisone cream (which you can get without a prescription) to reduce the inflammation.  Follow Up:  Make an appointment with your doctor in the next 1 to 2 weeks if your symptoms do not improve with the above measures.  Get Prompt Medical Attention  if any of the following occur:    Increasing area of redness or pain in the skin    Yellow crusts or drainage from the rash    Joint pain    New rash that appears in other areas of the body    Fever of 100.4 F (38 C) or higher, or as directed by your healthcare provider    [id number] [address], [place] [address]. All rights reserved. This information is not intended as a substitute for professional medical care. Always follow your healthcare professional's instructions.                  Follow-ups after your visit        Additional Services     DERMATOLOGY  REFERRAL       Your provider has referred you to: [facility]: [facility] [phone]   [email or web address]    Please be aware that coverage of these services is subject to the terms and limitations of your health insurance plan.  Call member services at your health plan with any benefit or coverage questions.      Please bring the following with you to your appointment:    (1) Any X-Rays, CTs or MRIs which have been performed.  Contact the facility where they were done to arrange for  prior to your scheduled appointment.    (2) List of current medications  (3) This referral request   (4) Any documents/labs given to you for this referral                  Your next 10 appointments already scheduled     Jul 27, 2017 10:30 AM CDT   LAB with  LAB   [facility] ([facility]    [address] [place] [id number] [phone]           Patient must bring picture ID.  Patient should be prepared to give a urine specimen  Please do not eat 10-12 hours before your appointment if you are coming in fasting for labs on lipids, cholesterol, or glucose (sugar).  Pregnant women should follow their Care Team instructions. Water with medications is okay. Do not drink coffee or other fluids.   If you have concerns about taking  your medications, please ask at office or if scheduling via What's Trending, send a message by clicking on Secure Messaging, Message Your Care Team.            Jul 27, 2017 11:00 AM CDT   Nurse Only with  MEDICAL ASSISTANT   [facility] ([facility])    [address] [place] [address] [phone]              Who to contact     If you have questions or need follow up information about today's clinic visit or your schedule please contact [facility] FAISAL MENDOZA directly at [phone].  Normal or non-critical lab and imaging results will be communicated to you by  "MyChart, letter or phone within 4 business days after the clinic has received the results. If you do not hear from us within 7 days, please contact the clinic through [employer] or phone. If you have a critical or abnormal lab result, we will notify you by phone as soon as possible.  Submit refill requests through [employer] or call your pharmacy and they will forward the refill request to us. Please allow 3 business days for your refill to be completed.          Additional Information About Your Visit        [employer] Information     [employer] lets you send messages to your doctor, view your test results, renew your prescriptions, schedule appointments and more. To sign up, go to [email or web address] . Click on \"Log in\" on the left side of the screen, which will take you to the Welcome page. Then click on \"Sign up Now\" on the right side of the page.     You will be asked to enter the access code listed below, as well as some personal information. Please follow the directions to create your username and password.     Your access code is: [email or web address]  Expires: 2017  1:55 PM     Your access code will  in 90 days. If you need help or a new code, please call your [place] clinic or [phone].        Care EveryWhere ID     This is your Care EveryWhere ID. This could be used by other organizations to access your [place] medical records  [id number]        Your Vitals Were     Pulse Temperature Respirations Height Last Period Pulse Oximetry    91 98.6  F (37  C) (Oral) 22 5' 9\" (1.753 m) 1990 98%    BMI (Body Mass Index)                   32.05 kg/m2            Blood Pressure from Last 3 Encounters:   17 125/74   17 134/70   17 136/84    Weight from Last 3 Encounters:   17 217 lb (98.4 kg)   17 209 lb (94.8 kg)   16 225 lb 8 oz (102.3 kg)              We Performed the Following     DERMATOLOGY REFERRAL          Today's Medication Changes          These changes are " accurate as of: 7/14/17 11:28 AM.  If you have any questions, ask your nurse or doctor.               Start taking these medicines.        Dose/Directions    diphenhydrAMINE 25 MG tablet   Commonly known as:  BENADRYL   Used for:  Rash   Started by:  Oskar Braun PA        Dose:  25-50 mg   Take 1-2 tablets (25-50 mg) by mouth every 6 hours as needed for itching or allergies   Quantity:  60 tablet   Refills:  1       predniSONE 10 MG tablet   Commonly known as:  DELTASONE   Used for:  Rash   Started by:  Oskar Braun PA        5 tabs PO QD x 2 days then 4 tabs PO QD x 2 days then 3 tabs PO QD x 2 days then 2 tabs PO QD x 2 days then 1 tab PO QD x 2 days   Quantity:  30 tablet   Refills:  0            Where to get your medicines      These medications were sent to [facility] [address] [place] [address], [place] - [address] [address]  [address] [address], [facility] [address]     Phone:  [phone]     diphenhydrAMINE 25 MG tablet    predniSONE 10 MG tablet                Primary Care Provider Office Phone # Fax #    Edelmira Ramirez [phone]       [facility] [address]  [facility] [address]        Equal Access to Services     ANJELICA ALLISON : Hadii benigno ku hadasho Soomaali, waaxda luqadaha, qaybta kaalmada adeegyada, waxay idiin haychristyn zelalem maravilla. So [facility] [phone].    ATENCIÓN: Si habla español, tiene a hong disposición servicios gratuitos de asistencia lingüística. Llame al [phone].    We comply with applicable federal civil rights laws and [place] laws. We do not discriminate on the basis of race, color, national origin, age, disability sex, sexual orientation or gender identity.            Thank you!     Thank you for choosing [facility]  for your care. Our goal is always to provide you with excellent care. Hearing back from our patients is one way we can continue to improve our services. Please take a few minutes to  complete the written survey that you may receive in the mail after your visit with us. Thank you!             Your Updated Medication List - Protect others around you: Learn how to safely use, store and throw away your medicines at www.disposemymeds.org.          This list is accurate as of: 7/14/17 11:28 AM.  Always use your most recent med list.                   Brand Name Dispense Instructions for use Diagnosis    CALCIUM 600 + D 600-200 MG-UNIT Tabs     3 MONTHS    1 daily        diphenhydrAMINE 25 MG tablet    BENADRYL    60 tablet    Take 1-2 tablets (25-50 mg) by mouth every 6 hours as needed for itching or allergies    Rash       hydrochlorothiazide 12.5 MG Tabs tablet     30 tablet    Take 1 tablet (12.5 mg) by mouth daily    Hypertension goal BP (blood pressure) < 140/90       lisinopril 20 MG tablet    PRINIVIL/ZESTRIL    90 tablet    Take 1 tablet (20 mg) by mouth daily    Hypertension goal BP (blood pressure) < 140/90       predniSONE 10 MG tablet    DELTASONE    30 tablet    5 tabs PO QD x 2 days then 4 tabs PO QD x 2 days then 3 tabs PO QD x 2 days then 2 tabs PO QD x 2 days then 1 tab PO QD x 2 days    Rash       pyridoxine 100 MG tablet    VITAMIN B-6     1 TABLET DAILY

## 2017-07-14 NOTE — NURSING NOTE
"Chief Complaint   Patient presents with     Derm Problem     itchy skin all over after eatring at [employer] -on 7/12/2017       Initial /74 (BP Location: Left arm, Patient Position: Chair, Cuff Size: Adult Large)  Pulse 91  Temp 98.6  F (37  C) (Oral)  Resp 22  Ht 5' 9\" (1.753 m)  Wt 217 lb (98.4 kg)  LMP 07/05/1990  SpO2 98%  BMI 32.05 kg/m2 Estimated body mass index is 32.05 kg/(m^2) as calculated from the following:    Height as of this encounter: 5' 9\" (1.753 m).    Weight as of this encounter: 217 lb (98.4 kg).  Medication Reconciliation: complete   Lilliam Lawson CMA      "

## 2017-07-27 ENCOUNTER — ALLIED HEALTH/NURSE VISIT (OUTPATIENT)
Dept: NURSING | Facility: CLINIC | Age: 82
End: 2017-07-27
Payer: COMMERCIAL

## 2017-07-27 VITALS — DIASTOLIC BLOOD PRESSURE: 77 MMHG | SYSTOLIC BLOOD PRESSURE: 127 MMHG | OXYGEN SATURATION: 99 % | HEART RATE: 65 BPM

## 2017-07-27 DIAGNOSIS — I10 HYPERTENSION GOAL BP (BLOOD PRESSURE) < 140/90: ICD-10-CM

## 2017-07-27 DIAGNOSIS — I10 HTN (HYPERTENSION): Primary | ICD-10-CM

## 2017-07-27 PROCEDURE — 80048 BASIC METABOLIC PNL TOTAL CA: CPT | Performed by: FAMILY MEDICINE

## 2017-07-27 PROCEDURE — 99207 ZZC NO CHARGE NURSE ONLY: CPT

## 2017-07-27 PROCEDURE — 36415 COLL VENOUS BLD VENIPUNCTURE: CPT | Performed by: FAMILY MEDICINE

## 2017-07-28 LAB
ANION GAP SERPL CALCULATED.3IONS-SCNC: 5 MMOL/L (ref 3–14)
BUN SERPL-MCNC: 12 MG/DL (ref 7–30)
CALCIUM SERPL-MCNC: 8.8 MG/DL (ref 8.5–10.1)
CHLORIDE SERPL-SCNC: 106 MMOL/L (ref 94–109)
CO2 SERPL-SCNC: 29 MMOL/L (ref 20–32)
CREAT SERPL-MCNC: 0.92 MG/DL (ref 0.52–1.04)
GFR SERPL CREATININE-BSD FRML MDRD: 59 ML/MIN/1.7M2
GLUCOSE SERPL-MCNC: 89 MG/DL (ref 70–99)
POTASSIUM SERPL-SCNC: 3.7 MMOL/L (ref 3.4–5.3)
SODIUM SERPL-SCNC: 140 MMOL/L (ref 133–144)

## 2017-10-24 DIAGNOSIS — I10 HYPERTENSION GOAL BP (BLOOD PRESSURE) < 140/90: ICD-10-CM

## 2017-10-25 RX ORDER — LISINOPRIL 20 MG/1
TABLET ORAL
Qty: 90 TABLET | Refills: 2 | Status: SHIPPED | OUTPATIENT
Start: 2017-10-25 | End: 2018-10-10

## 2017-10-25 NOTE — TELEPHONE ENCOUNTER
Last office visit : 7/14/17     Potassium   Date Value Ref Range Status   07/27/2017 3.7 3.4 - 5.3 mmol/L Final   ]  Creatinine   Date Value Ref Range Status   07/27/2017 0.92 0.52 - 1.04 mg/dL Final   ]  BP Readings from Last 3 Encounters:   07/27/17 127/77   07/14/17 125/74   07/06/17 134/70     Prescription approved per [facility] Refill Protocol.  Radha Cervantes RN

## 2017-11-18 DIAGNOSIS — I10 HYPERTENSION GOAL BP (BLOOD PRESSURE) < 140/90: ICD-10-CM

## 2017-11-20 NOTE — TELEPHONE ENCOUNTER
lov 7/14/17  hydrochlorothiazide 12.5 MG TABS tablet 30 tablet 1 7/11/2017  --   Sig: Take 1 tablet (12.5 mg) by mouth daily     real garvey

## 2017-11-20 NOTE — TELEPHONE ENCOUNTER
Reason for Call:  Medication or medication refill:    Do you use a [facility]?  Name of the pharmacy and phone number for the current request: [place] [id number]    Name of the medication requested: hydrochlorothiazide 12.5 MG TABS tablet    Other request: Patient has one tablet left, please advise. Thank you!    Can we leave a detailed message on this number? YES    Phone number patient can be reached at: Home number on file [phone] (home)    Best Time: anytime    Call taken on 11/20/2017 at 12:42 PM by Jeni Mane

## 2017-11-21 RX ORDER — HYDROCHLOROTHIAZIDE 12.5 MG/1
TABLET ORAL
Qty: 30 TABLET | Refills: 2 | Status: SHIPPED | OUTPATIENT
Start: 2017-11-21 | End: 2018-03-01

## 2018-03-01 DIAGNOSIS — I10 HYPERTENSION GOAL BP (BLOOD PRESSURE) < 140/90: ICD-10-CM

## 2018-03-01 NOTE — TELEPHONE ENCOUNTER
"Requested Prescriptions   Pending Prescriptions Disp Refills     hydrochlorothiazide 12.5 MG TABS tablet [Pharmacy Med Name: HydroCHLOROthiazide Oral Tablet 12.5 MG]  Last Written Prescription Date:  11/21/17  Last Fill Quantity: 30,  # refills: 2   Last office visit: 7/14/2017 with prescribing provider:     Future Office Visit:   30 tablet 1     Sig: TAKE ONE TABLET BY MOUTH ONE TIME DAILY    Diuretics (Including Combos) Protocol Passed    3/1/2018  3:45 PM       Passed - Blood pressure under 140/90 in past 12 months    BP Readings from Last 3 Encounters:   07/27/17 127/77   07/14/17 125/74   07/06/17 134/70          Passed - Recent or future visit with authorizing provider's specialty    Patient had office visit in the last year or has a visit in the next 30 days with authorizing provider.  See \"Patient Info\" tab in inbasket, or \"Choose Columns\" in Meds & Orders section of the refill encounter.        Passed - Patient is age 18 or older       Passed - No active pregancy on record       Passed - Normal serum creatinine on file in past 12 months    Recent Labs   Lab Test  07/27/17   1020   CR  0.92           Passed - Normal serum potassium on file in past 12 months    Recent Labs   Lab Test  07/27/17   1020   POTASSIUM  3.7           Passed - Normal serum sodium on file in past 12 months    Recent Labs   Lab Test  07/27/17   1020   NA  140             Passed - No positive pregnancy test in past 12 months        "

## 2018-03-02 RX ORDER — HYDROCHLOROTHIAZIDE 12.5 MG/1
TABLET ORAL
Qty: 30 TABLET | Refills: 3 | Status: SHIPPED | OUTPATIENT
Start: 2018-03-02 | End: 2018-08-09

## 2018-08-09 DIAGNOSIS — I10 HYPERTENSION GOAL BP (BLOOD PRESSURE) < 140/90: ICD-10-CM

## 2018-08-09 NOTE — TELEPHONE ENCOUNTER
"Requested Prescriptions   Pending Prescriptions Disp Refills     hydrochlorothiazide 12.5 MG TABS tablet [Pharmacy Med Name: HydroCHLOROthiazide Oral Tablet 12.5 MG]  Last Written Prescription Date:  3/2/2018  Last Fill Quantity: 30 tablet,  # refills: 3   Last Office Visit: 7/14/2017   Future Office Visit:      30 tablet 2     Sig: TAKE ONE TABLET BY MOUTH ONE TIME DAILY.    Diuretics (Including Combos) Protocol Failed    8/9/2018  9:21 AM       Failed - Blood pressure under 140/90 in past 12 months    BP Readings from Last 3 Encounters:   07/27/17 127/77   07/14/17 125/74   07/06/17 134/70          Failed - Recent (12 mo) or future (30 days) visit within the authorizing provider's specialty    Patient had office visit in the last 12 months or has a visit in the next 30 days with authorizing provider or within the authorizing provider's specialty.  See \"Patient Info\" tab in inbasket, or \"Choose Columns\" in Meds & Orders section of the refill encounter.           Failed - Normal serum creatinine on file in past 12 months    Recent Labs   Lab Test  07/27/17   1020   CR  0.92           Failed - Normal serum potassium on file in past 12 months    Recent Labs   Lab Test  07/27/17   1020   POTASSIUM  3.7           Failed - Normal serum sodium on file in past 12 months    Recent Labs   Lab Test  07/27/17   1020   NA  140           Passed - Patient is age 18 or older       Passed - No active pregancy on record       Passed - No positive pregnancy test in past 12 months          "

## 2018-08-14 RX ORDER — HYDROCHLOROTHIAZIDE 12.5 MG/1
TABLET ORAL
Qty: 30 TABLET | Refills: 0 | Status: SHIPPED | OUTPATIENT
Start: 2018-08-14 | End: 2018-10-10

## 2018-08-14 NOTE — TELEPHONE ENCOUNTER
Please call patient and schedule an annual office visit.  Patient has not been seen in clinic for greater than 1 year.  Nicole Fernández RN

## 2018-10-10 ENCOUNTER — OFFICE VISIT (OUTPATIENT)
Dept: FAMILY MEDICINE | Facility: CLINIC | Age: 83
End: 2018-10-10
Payer: COMMERCIAL

## 2018-10-10 VITALS
SYSTOLIC BLOOD PRESSURE: 138 MMHG | HEART RATE: 63 BPM | WEIGHT: 181.25 LBS | DIASTOLIC BLOOD PRESSURE: 82 MMHG | OXYGEN SATURATION: 99 % | BODY MASS INDEX: 26.77 KG/M2

## 2018-10-10 DIAGNOSIS — R73.02 IMPAIRED GLUCOSE TOLERANCE: ICD-10-CM

## 2018-10-10 DIAGNOSIS — E78.5 HYPERLIPIDEMIA LDL GOAL <130: ICD-10-CM

## 2018-10-10 DIAGNOSIS — Z00.00 MEDICARE ANNUAL WELLNESS VISIT, SUBSEQUENT: Primary | ICD-10-CM

## 2018-10-10 DIAGNOSIS — Z23 NEED FOR PROPHYLACTIC VACCINATION AND INOCULATION AGAINST INFLUENZA: ICD-10-CM

## 2018-10-10 DIAGNOSIS — I10 HYPERTENSION GOAL BP (BLOOD PRESSURE) < 140/90: ICD-10-CM

## 2018-10-10 LAB — HBA1C MFR BLD: NORMAL % (ref 0–5.6)

## 2018-10-10 PROCEDURE — 99397 PER PM REEVAL EST PAT 65+ YR: CPT | Mod: 25 | Performed by: FAMILY MEDICINE

## 2018-10-10 PROCEDURE — 83036 HEMOGLOBIN GLYCOSYLATED A1C: CPT | Performed by: FAMILY MEDICINE

## 2018-10-10 PROCEDURE — G0008 ADMIN INFLUENZA VIRUS VAC: HCPCS | Performed by: FAMILY MEDICINE

## 2018-10-10 PROCEDURE — 36415 COLL VENOUS BLD VENIPUNCTURE: CPT | Performed by: FAMILY MEDICINE

## 2018-10-10 PROCEDURE — 90662 IIV NO PRSV INCREASED AG IM: CPT | Performed by: FAMILY MEDICINE

## 2018-10-10 PROCEDURE — 80048 BASIC METABOLIC PNL TOTAL CA: CPT | Performed by: FAMILY MEDICINE

## 2018-10-10 RX ORDER — HYDROCHLOROTHIAZIDE 12.5 MG/1
12.5 TABLET ORAL DAILY
Qty: 90 TABLET | Refills: 3 | Status: SHIPPED | OUTPATIENT
Start: 2018-10-10 | End: 2019-05-21 | Stop reason: ALTCHOICE

## 2018-10-10 RX ORDER — LISINOPRIL 20 MG/1
20 TABLET ORAL DAILY
Qty: 90 TABLET | Refills: 3 | Status: SHIPPED | OUTPATIENT
Start: 2018-10-10 | End: 2019-11-18

## 2018-10-10 NOTE — PROGRESS NOTES

## 2018-10-10 NOTE — PATIENT INSTRUCTIONS
I recommend getting the new shingles vaccine, Shingrix.  You can call your insurance company to find out if this vaccine is covered.  You may also ask our pharmacy to check if your insurance covers Shingrix if given at the pharmacy.     Preventive Health Recommendations    Female Ages 65 +    Yearly exam:     See your health care provider every year in order to  o Review health changes.   o Discuss preventive care.    o Review your medicines if your doctor has prescribed any.      You no longer need a yearly Pap test unless you've had an abnormal Pap test in the past 10 years. If you have vaginal symptoms, such as bleeding or discharge, be sure to talk with your provider about a Pap test.      Every 1 to 2 years, have a mammogram.  If you are over 69, talk with your health care provider about whether or not you want to continue having screening mammograms.      Every 10 years, have a colonoscopy. Or, have a yearly FIT test (stool test). These exams will check for colon cancer.       Have a cholesterol test every 5 years, or more often if your doctor advises it.       Have a diabetes test (fasting glucose) every three years. If you are at risk for diabetes, you should have this test more often.       At age 65, have a bone density scan (DEXA) to check for osteoporosis (brittle bone disease).    Shots:    Get a flu shot each year.    Get a tetanus shot every 10 years.    Talk to your doctor about your pneumonia vaccines. There are now two you should receive - Pneumovax (PPSV 23) and Prevnar (PCV 13).    Talk to your pharmacist about the shingles vaccine.    Talk to your doctor about the hepatitis B vaccine.    Nutrition:     Eat at least 5 servings of fruits and vegetables each day.      Eat whole-grain bread, whole-wheat pasta and brown rice instead of white grains and rice.      Get adequate Calcium and Vitamin D.     Lifestyle    Exercise at least 150 minutes a week (30 minutes a day, 5 days a week). This will help  you control your weight and prevent disease.      Limit alcohol to one drink per day.      No smoking.       Wear sunscreen to prevent skin cancer.       See your dentist twice a year for an exam and cleaning.      See your eye doctor every 1 to 2 years to screen for conditions such as glaucoma, macular degeneration and cataracts.

## 2018-10-10 NOTE — PROGRESS NOTES
SUBJECTIVE:   Mya Mack is a 83 year old female who presents for Preventive Visit.  Are you in the first 12 months of your Medicare Part B coverage?  No    Healthy Habits:    Do you get at least three servings of calcium containing foods daily (dairy, green leafy vegetables, etc.)? yes    Amount of exercise or daily activities, outside of work: 4-5 day(s) per week    Problems taking medications regularly No    Medication side effects: No    Have you had an eye exam in the past two years? yes    Do you see a dentist twice per year? yes    Do you have sleep apnea, excessive snoring or daytime drowsiness?no      Ability to successfully perform activities of daily living: Yes, no assistance needed    Home safety:  none identified     Hearing impairment: No    Fall risk:  Fallen 2 or more times in the past year?: No  Any fall with injury in the past year?: No    COGNITIVE SCREEN  1) Repeat 3 items (Leader, Season, Table)    2) Clock draw: NORMAL  3) 3 item recall: Recalls 2 objects   Results: NORMAL clock, 1-2 items recalled: COGNITIVE IMPAIRMENT LESS LIKELY    Mini-CogTM Copyright RIAN Navarro. Licensed by the author for use in [facility]; reprinted with permission (shankar@[email or web address]). All rights reserved.      Last year she reported some difficulties with short-term memory.  She had been writing things down on a daily basis to help her remember things.     She goes to the library regularly and last year she befriended homeless lady that she had gotten to know at the library.  She let this person move in with her and she has been living with her the past year.  Unfortunately, this lady became ill recently, was hospitalized and is now in rehab.  Going on is very sad not to have a  in her home anymore.  She also feels guilty saying she does not feel comfortable having her come back to her house.  She has stairs in her home and this person is unable to climb stairs.  In addition she does not feel  comfortable having a PCA coming to her home to care for her friend.    She says she may be needing to move her care closer to home.  She also has to find out from insurance whether [place] will still be in her network.    She reports that she continues to lift weights at home.  Her whole life she was very active.  She played golf multiple times a week up until just about 10 years ago. She says if there was a sport that involved a ball, she played it. She showed me pictures of herself golfing in [place]. She had four brothers and was very active and competitive with them.     Reviewed and updated as needed this visit by clinical staff  Tobacco  Allergies  Meds         Reviewed and updated as needed this visit by Provider        Social History   Substance Use Topics     Smoking status: Never Smoker     Smokeless tobacco: Never Used     Alcohol use Yes      Comment: 0-1 drinks per week       If you drink alcohol do you typically have >3 drinks per day or >7 drinks per week? Not Applicable                        Today's PHQ-2 Score:   PHQ-2 ( 1999 Pf[employer]) 10/10/2018 7/14/2017   Q1: Little interest or pleasure in doing things 0 0   Q2: Feeling down, depressed or hopeless 1 0   PHQ-2 Score 1 0       Do you feel safe in your environment - Yes- not at night due to area     Do you have a Health Care Directive?: No: Advance care planning was reviewed with patient; patient declined at this time.    Current providers sharing in care for this patient include:   Patient Care Team:  Edelmira Ramirez MD as PCP - General (Family Practice)    The following health maintenance items are reviewed in Epic and correct as of today:  Health Maintenance   Topic Date Due     ADVANCE DIRECTIVE PLANNING Q5 YRS  12/28/2017     BMP Q6 MOS  01/27/2018     FALL RISK ASSESSMENT  04/25/2018     A1C Q1 YR  04/25/2018     PHQ-2 Q1 YR  07/14/2018     INFLUENZA VACCINE (1) 09/01/2018     TETANUS IMMUNIZATION (SYSTEM ASSIGNED)  07/05/2020     DEXA  SCAN SCREENING (SYSTEM ASSIGNED)  Completed     PNEUMOCOCCAL  Completed     BP Readings from Last 3 Encounters:   10/10/18 138/82   07/27/17 127/77   07/14/17 125/74    Wt Readings from Last 3 Encounters:   10/10/18 181 lb 4 oz (82.2 kg)   07/14/17 217 lb (98.4 kg)   04/25/17 209 lb (94.8 kg)                  Patient Active Problem List   Diagnosis     Obesity     Impaired glucose tolerance     Hypertension goal BP (blood pressure) < 140/90     Hyperlipidemia LDL goal <130     Advanced directives, counseling/discussion     Primary osteoarthritis of right knee     Past Surgical History:   Procedure Laterality Date     C NONSPECIFIC PROCEDURE  2003    flex sig     COLONOSCOPY  2010    due 2013     HYSTERECTOMY, PAP NO LONGER INDICATED       SURGICAL HISTORY OF -       CARMENZA       Social History   Substance Use Topics     Smoking status: Never Smoker     Smokeless tobacco: Never Used     Alcohol use Yes      Comment: 0-1 drinks per week     Family History   Problem Relation Age of Onset     Diabetes Father      type 2         Current Outpatient Prescriptions   Medication Sig Dispense Refill     CALCIUM 600 + D 600-200 MG-UNIT OR TABS 1 daily 3 MONTHS 1 YEAR     hydrochlorothiazide 12.5 MG TABS tablet TAKE ONE TABLET BY MOUTH ONE TIME DAILY.  30 tablet 0     lisinopril (PRINIVIL/ZESTRIL) 20 MG tablet TAKE ONE TABLET BY MOUTH ONE TIME DAILY  90 tablet 2     VITAMIN B-6 100 MG OR TABS 1 TABLET DAILY       Allergies   Allergen Reactions     Nkda [No Known Drug Allergies]      Recent Labs   Lab Test  07/27/17   1020  07/06/17   1042   04/25/17   1413  02/23/16   1303   02/05/15   1340   01/10/14   1303   06/05/12   1103   A1C   --    --    --   5.2  6.0   --    --    --   5.9   < >   --    LDL   --    --    --   79  94   --   97   --   103   < >   --    HDL   --    --    --   88  70   --   65   --   51   < >   --    TRIG   --    --    --   80  90   --   86   --   108   < >   --    ALT   --    --    --    --    --    --    --   "  --   25   --   <6   CR  0.92  0.80   < >  0.82  0.86   < >  0.84   < >  0.92   < >  0.77   GFRESTIMATED  59*  69   < >  67  64   < >  66   < >  59*   < >  73   GFRESTBLACK  71  84   < >  81  77   < >  79   < >  72   < >  89   POTASSIUM  3.7  3.3*   < >  3.3*  3.5   < >  3.4   < >  3.5   < >  3.7    < > = values in this interval not displayed.            ROS:   ROS: 10 point ROS neg other than the symptoms noted above in the HPI.     OBJECTIVE:   /82  Pulse 63  Wt 181 lb 4 oz (82.2 kg)  LMP 07/05/1990  SpO2 99%  BMI 26.77 kg/m2 Estimated body mass index is 26.77 kg/(m^2) as calculated from the following:    Height as of 7/14/17: 5' 9\" (1.753 m).    Weight as of this encounter: 181 lb 4 oz (82.2 kg).      EXAM:   GENERAL: healthy, alert and no distress, appears younger than stated age  EYES: Eyes grossly normal to inspection, PERRL and conjunctivae and sclerae normal  HENT: ear canals and TM's normal, nose and mouth without ulcers or lesions  NECK: no adenopathy, no asymmetry, masses, or scars and thyroid normal to palpation  RESP: lungs clear to auscultation - no rales, rhonchi or wheezes  CV: regular rate and rhythm, normal S1 S2, no S3 or S4, no murmur, click or rub, no peripheral edema and peripheral pulses strong  ABDOMEN: soft, nontender, no hepatosplenomegaly, no masses and bowel sounds normal  MS: no gross musculoskeletal defects noted, no edema  SKIN: no suspicious lesions or rashes  NEURO: Normal strength and tone, mentation intact and speech normal  PSYCH: mentation appears normal, affect normal/bright    Diagnostic Test Results:  none     ASSESSMENT / PLAN:     1. Medicare annual wellness visit, subsequent  Mammo at this point will discontinue screenings  PAP not indicated based on age.         2. Hypertension goal BP (blood pressure) < 140/90  Controlled She continues on lisinopril and HCTZ.  - BASIC METABOLIC PANEL  - lisinopril (PRINIVIL/ZESTRIL) 10 MG tablet; Take 1 tablet (10 mg) by mouth " daily  Dispense: 90 tablet; Refill: 3  - hydrochlorothiazide (HYDRODIURIL) 25 MG tablet; Take 1 tablet (25 mg) by mouth daily  Dispense: 90 tablet; Refill: 3     3. Hyperlipidemia LDL goal <130  Stable. Fasting labs today.  - Lipid Profile with reflex to direct LDL     4. Impaired glucose tolerance     Her A1C last year was within prediabetic range. Will recheck her A1C today.  - HEMOGLOBIN A1C     5. Encounter for immunization     Flu shot given today, recommended shingrix.      End of Life Planning:  Patient currently has an advanced directive: No.  I have verified the patient's ablity to prepare an advanced directive/make health care decisions.  Literature was provided to assist patient in preparing an advanced directive.     Patient Instructions   I recommend getting the new shingles vaccine, Shingrix.  You can call your insurance company to find out if this vaccine is covered.  You may also ask our pharmacy to check if your insurance covers Shingrix if given at the pharmacy.     Preventive Health Recommendations    Female Ages 65 +    Yearly exam:     See your health care provider every year in order to  o Review health changes.   o Discuss preventive care.    o Review your medicines if your doctor has prescribed any.      You no longer need a yearly Pap test unless you've had an abnormal Pap test in the past 10 years. If you have vaginal symptoms, such as bleeding or discharge, be sure to talk with your provider about a Pap test.      Every 1 to 2 years, have a mammogram.  If you are over 69, talk with your health care provider about whether or not you want to continue having screening mammograms.      Every 10 years, have a colonoscopy. Or, have a yearly FIT test (stool test). These exams will check for colon cancer.       Have a cholesterol test every 5 years, or more often if your doctor advises it.       Have a diabetes test (fasting glucose) every three years. If you are at risk for diabetes, you should  "have this test more often.       At age 65, have a bone density scan (DEXA) to check for osteoporosis (brittle bone disease).    Shots:    Get a flu shot each year.    Get a tetanus shot every 10 years.    Talk to your doctor about your pneumonia vaccines. There are now two you should receive - Pneumovax (PPSV 23) and Prevnar (PCV 13).    Talk to your pharmacist about the shingles vaccine.    Talk to your doctor about the hepatitis B vaccine.    Nutrition:     Eat at least 5 servings of fruits and vegetables each day.      Eat whole-grain bread, whole-wheat pasta and brown rice instead of white grains and rice.      Get adequate Calcium and Vitamin D.     Lifestyle    Exercise at least 150 minutes a week (30 minutes a day, 5 days a week). This will help you control your weight and prevent disease.      Limit alcohol to one drink per day.      No smoking.       Wear sunscreen to prevent skin cancer.       See your dentist twice a year for an exam and cleaning.      See your eye doctor every 1 to 2 years to screen for conditions such as glaucoma, macular degeneration and cataracts.       COUNSELING:  Reviewed preventive health counseling, as reflected in patient instructions    BP Readings from Last 1 Encounters:   10/10/18 138/82     Estimated body mass index is 26.77 kg/(m^2) as calculated from the following:    Height as of 7/14/17: 5' 9\" (1.753 m).    Weight as of this encounter: 181 lb 4 oz (82.2 kg).           reports that she has never smoked. She has never used smokeless tobacco.      Appropriate preventive services were discussed with this patient, including applicable screening as appropriate for cardiovascular disease, diabetes, osteopenia/osteoporosis, and glaucoma.  As appropriate for age/gender, discussed screening for colorectal cancer, prostate cancer, breast cancer, and cervical cancer. Checklist reviewing preventive services available has been given to the patient.    Reviewed patients plan of care " and provided an AVS. The Intermediate Care Plan ( asthma action plan, low back pain action plan, and migraine action plan) for Mya meets the Care Plan requirement. This Care Plan has been established and reviewed with the Patient.    Counseling Resources:  ATP IV Guidelines  Pooled Cohorts Equation Calculator  Breast Cancer Risk Calculator  FRAX Risk Assessment  ICSI Preventive Guidelines  Dietary Guidelines for Americans, 2010  [employer]'s MyPlate  ASA Prophylaxis  Lung CA Screening    Edelmira Ramirez MD  [facility]

## 2018-10-10 NOTE — MR AVS SNAPSHOT
After Visit Summary   10/10/2018    Mya Mack    MRN: [id number]           Patient Information     Date Of Birth          1935        Visit Information        Provider Department      10/10/2018 10:40 AM Edelmira Ramirez MD [facility]        Today's Diagnoses     Medicare annual wellness visit, subsequent    -  1    Hypertension goal BP (blood pressure) < 140/90        Hyperlipidemia LDL goal <130        Impaired glucose tolerance          Care Instructions    I recommend getting the new shingles vaccine, Shingrix.  You can call your insurance company to find out if this vaccine is covered.  You may also ask our pharmacy to check if your insurance covers Shingrix if given at the pharmacy.     Preventive Health Recommendations    Female Ages 65 +    Yearly exam:     See your health care provider every year in order to  o Review health changes.   o Discuss preventive care.    o Review your medicines if your doctor has prescribed any.      You no longer need a yearly Pap test unless you've had an abnormal Pap test in the past 10 years. If you have vaginal symptoms, such as bleeding or discharge, be sure to talk with your provider about a Pap test.      Every 1 to 2 years, have a mammogram.  If you are over 69, talk with your health care provider about whether or not you want to continue having screening mammograms.      Every 10 years, have a colonoscopy. Or, have a yearly FIT test (stool test). These exams will check for colon cancer.       Have a cholesterol test every 5 years, or more often if your doctor advises it.       Have a diabetes test (fasting glucose) every three years. If you are at risk for diabetes, you should have this test more often.       At age 65, have a bone density scan (DEXA) to check for osteoporosis (brittle bone disease).    Shots:    Get a flu shot each year.    Get a tetanus shot every 10 years.    Talk to your doctor about your pneumonia  vaccines. There are now two you should receive - Pneumovax (PPSV 23) and Prevnar (PCV 13).    Talk to your pharmacist about the shingles vaccine.    Talk to your doctor about the hepatitis B vaccine.    Nutrition:     Eat at least 5 servings of fruits and vegetables each day.      Eat whole-grain bread, whole-wheat pasta and brown rice instead of white grains and rice.      Get adequate Calcium and Vitamin D.     Lifestyle    Exercise at least 150 minutes a week (30 minutes a day, 5 days a week). This will help you control your weight and prevent disease.      Limit alcohol to one drink per day.      No smoking.       Wear sunscreen to prevent skin cancer.       See your dentist twice a year for an exam and cleaning.      See your eye doctor every 1 to 2 years to screen for conditions such as glaucoma, macular degeneration and cataracts.          Follow-ups after your visit        Who to contact     If you have questions or need follow up information about today's clinic visit or your schedule please contact [facility] directly at [phone].  Normal or non-critical lab and imaging results will be communicated to you by MyChart, letter or phone within 4 business days after the clinic has received the results. If you do not hear from us within 7 days, please contact the clinic through MyChart or phone. If you have a critical or abnormal lab result, we will notify you by phone as soon as possible.  Submit refill requests through [employer] or call your pharmacy and they will forward the refill request to us. Please allow 3 business days for your refill to be completed.          Additional Information About Your Visit        Care EveryWhere ID     This is your Care EveryWhere ID. This could be used by other organizations to access your [place] medical records  [id number]        Your Vitals Were     Pulse Last Period Pulse Oximetry BMI (Body Mass Index)          63 07/05/1990 99% 26.77 kg/m2          Blood Pressure from Last 3 Encounters:   10/10/18 138/82   07/27/17 127/77   07/14/17 125/74    Weight from Last 3 Encounters:   10/10/18 181 lb 4 oz (82.2 kg)   07/14/17 217 lb (98.4 kg)   04/25/17 209 lb (94.8 kg)              We Performed the Following     BASIC METABOLIC PANEL     HEMOGLOBIN A1C          Today's Medication Changes          These changes are accurate as of 10/10/18 11:54 AM.  If you have any questions, ask your nurse or doctor.               These medicines have changed or have updated prescriptions.        Dose/Directions    hydrochlorothiazide 12.5 MG Tabs tablet   This may have changed:  See the new instructions.   Used for:  Hypertension goal BP (blood pressure) < 140/90   Changed by:  Edelmira Ramirez MD        Dose:  12.5 mg   Take 1 tablet (12.5 mg) by mouth daily   Quantity:  90 tablet   Refills:  3       lisinopril 20 MG tablet   Commonly known as:  PRINIVIL/ZESTRIL   This may have changed:  See the new instructions.   Used for:  Hypertension goal BP (blood pressure) < 140/90   Changed by:  Edelmira Ramirez MD        Dose:  20 mg   Take 1 tablet (20 mg) by mouth daily   Quantity:  90 tablet   Refills:  3         Stop taking these medicines if you haven't already. Please contact your care team if you have questions.     diphenhydrAMINE 25 MG tablet   Commonly known as:  BENADRYL   Stopped by:  Edelmira Ramirez MD                Where to get your medicines      These medications were sent to [facility] [address] [place] [address], [facility] [address]     Phone:  [phone]     hydrochlorothiazide 12.5 MG Tabs tablet    lisinopril 20 MG tablet                Primary Care Provider Office Phone # Fax #    Edelmira Ramirez [phone] [address] [place] [address]        Equal Access to Services     ANJELICA ALLISON AH: Lorna Vinson, maury huizar, gabriella landry  christopher last ah. So [facility] [phone].    ATENCIÓN: Si habla flores, tiene a hong disposición servicios gratuitos de asistencia lingüística. Kristin calero [phone].    We comply with applicable federal civil rights laws and [place] laws. We do not discriminate on the basis of race, color, national origin, age, disability, sex, sexual orientation, or gender identity.            Thank you!     Thank you for choosing [facility]  for your care. Our goal is always to provide you with excellent care. Hearing back from our patients is one way we can continue to improve our services. Please take a few minutes to complete the written survey that you may receive in the mail after your visit with us. Thank you!             Your Updated Medication List - Protect others around you: Learn how to safely use, store and throw away your medicines at www.disposemymeds.org.          This list is accurate as of 10/10/18 11:54 AM.  Always use your most recent med list.                   Brand Name Dispense Instructions for use Diagnosis    CALCIUM 600 + D 600-200 MG-UNIT Tabs     3 MONTHS    1 daily        hydrochlorothiazide 12.5 MG Tabs tablet     90 tablet    Take 1 tablet (12.5 mg) by mouth daily    Hypertension goal BP (blood pressure) < 140/90       lisinopril 20 MG tablet    PRINIVIL/ZESTRIL    90 tablet    Take 1 tablet (20 mg) by mouth daily    Hypertension goal BP (blood pressure) < 140/90       pyridoxine 100 MG tablet    VITAMIN B-6     1 TABLET DAILY

## 2018-10-11 LAB
ANION GAP SERPL CALCULATED.3IONS-SCNC: 8 MMOL/L (ref 3–14)
BUN SERPL-MCNC: 12 MG/DL (ref 7–30)
CALCIUM SERPL-MCNC: 8.9 MG/DL (ref 8.5–10.1)
CHLORIDE SERPL-SCNC: 106 MMOL/L (ref 94–109)
CO2 SERPL-SCNC: 28 MMOL/L (ref 20–32)
CREAT SERPL-MCNC: 0.8 MG/DL (ref 0.52–1.04)
GFR SERPL CREATININE-BSD FRML MDRD: 68 ML/MIN/1.7M2
GLUCOSE SERPL-MCNC: 86 MG/DL (ref 70–99)
POTASSIUM SERPL-SCNC: 3.5 MMOL/L (ref 3.4–5.3)
SODIUM SERPL-SCNC: 142 MMOL/L (ref 133–144)

## 2018-12-12 ENCOUNTER — OFFICE VISIT (OUTPATIENT)
Dept: FAMILY MEDICINE | Facility: CLINIC | Age: 83
End: 2018-12-12
Payer: COMMERCIAL

## 2018-12-12 VITALS
OXYGEN SATURATION: 97 % | BODY MASS INDEX: 25.86 KG/M2 | HEART RATE: 79 BPM | WEIGHT: 174.6 LBS | TEMPERATURE: 98.2 F | HEIGHT: 69 IN | SYSTOLIC BLOOD PRESSURE: 150 MMHG | DIASTOLIC BLOOD PRESSURE: 70 MMHG

## 2018-12-12 DIAGNOSIS — K59.01 SLOW TRANSIT CONSTIPATION: ICD-10-CM

## 2018-12-12 DIAGNOSIS — I10 HYPERTENSION GOAL BP (BLOOD PRESSURE) < 140/90: Primary | ICD-10-CM

## 2018-12-12 PROCEDURE — 99213 OFFICE O/P EST LOW 20 MIN: CPT | Performed by: NURSE PRACTITIONER

## 2018-12-12 ASSESSMENT — MIFFLIN-ST. JEOR: SCORE: 1321.36

## 2018-12-12 NOTE — PROGRESS NOTES
SUBJECTIVE:   Mya Mack is a 83 year old female who presents to clinic today for the following health issues:      ED/UC Followup:    Facility:  [facility]  Date of visit: 12/8/18  Reason for visit: Constipation  Current Status: feeling better and is having more BM's       Constipation      Duration: for a few months    Description:       Frequency of bowel movements: in the beginning it was once every two weeks but recently she has had one this monring       Consistency of stool: watery    Intensity:  moderate, severe    Accompanying signs and symptoms:        Abdominal pain: YES       Rectal pain: no        Blood in stool: no        Nausea/vomitting: no     History:        Similar problems in past: no     Precipitating or alleviating factors:        Medications worsening symptoms: no     Therapies tried and outcome: Tylenol and medication from ED       Chronic laxative use: no   Patient has been seen in the ER twice for constipation.  She is now having daily small  BM and is drinking more water- previously was not drinking much water and was not getting much fiber in the diet (eating out a lot).  She is now preparing her meals at home and getting more fiber.  She continues to have mild LLQ abdominal pain that is worse with walking- wearing an elastic band on lower abdomen which helps.  No urinary frequency, urgency, dysuria, no vaginal discharge, itching, burning, no flank or back pain.      Problem list and histories reviewed & adjusted, as indicated.  Additional history: as documented    Patient Active Problem List   Diagnosis     Obesity     Impaired glucose tolerance     Hypertension goal BP (blood pressure) < 140/90     Hyperlipidemia LDL goal <130     Advanced directives, counseling/discussion     Primary osteoarthritis of right knee     Past Surgical History:   Procedure Laterality Date     C NONSPECIFIC PROCEDURE  2003    flex sig     COLONOSCOPY  2010    due 2013     HYSTERECTOMY,  "PAP NO LONGER INDICATED       SURGICAL HISTORY OF -       [place]       Social History     Tobacco Use     Smoking status: Never Smoker     Smokeless tobacco: Never Used   Substance Use Topics     Alcohol use: Yes     Comment: 0-1 drinks per week     Family History   Problem Relation Age of Onset     Diabetes Father         type 2         Current Outpatient Medications   Medication Sig Dispense Refill     CALCIUM 600 + D 600-200 MG-UNIT OR TABS 1 daily 3 MONTHS 1 YEAR     hydrochlorothiazide 12.5 MG TABS tablet Take 1 tablet (12.5 mg) by mouth daily 90 tablet 3     lisinopril (PRINIVIL/ZESTRIL) 20 MG tablet Take 1 tablet (20 mg) by mouth daily 90 tablet 3     VITAMIN B-6 100 MG OR TABS 1 TABLET DAILY       BP Readings from Last 3 Encounters:   12/12/18 150/70   10/10/18 138/82   07/27/17 127/77    Wt Readings from Last 3 Encounters:   12/12/18 79.2 kg (174 lb 9.6 oz)   10/10/18 82.2 kg (181 lb 4 oz)   07/14/17 98.4 kg (217 lb)                    Reviewed and updated as needed this visit by clinical staff  Tobacco  Allergies  Meds  Med Hx  Surg Hx  Fam Hx  Soc Hx      Reviewed and updated as needed this visit by Provider         ROS:  Constitutional, HEENT, cardiovascular, pulmonary, GI, , musculoskeletal, neuro, skin, endocrine and psych systems are negative, except as otherwise noted.    OBJECTIVE:     /70   Pulse 79   Temp 98.2  F (36.8  C) (Oral)   Ht 1.753 m (5' 9\")   Wt 79.2 kg (174 lb 9.6 oz)   LMP 07/05/1990   SpO2 97%   BMI 25.78 kg/m    Body mass index is 25.78 kg/m .  GENERAL: healthy, alert and no distress  EYES: Eyes grossly normal to inspection, PERRL and conjunctivae and sclerae normal  HENT: ear canals and TM's normal, nose and mouth without ulcers or lesions  NECK: no adenopathy, no asymmetry, masses, or scars and thyroid normal to palpation  RESP: lungs clear to auscultation - no rales, rhonchi or wheezes  CV: regular rate and rhythm, normal S1 S2, no S3 or S4, no murmur, click or " "rub, no peripheral edema and peripheral pulses strong  ABDOMEN: soft, nontender, no hepatosplenomegaly, no masses and bowel sounds normal  MS: no gross musculoskeletal defects noted, no edema  SKIN: no suspicious lesions or rashes  NEURO: Normal strength and tone, mentation intact and speech normal  BACK: no CVA tenderness, no paralumbar tenderness  PSYCH: mentation appears normal, affect normal/bright  LYMPH: normal ant/post cervical, supraclavicular nodes    Diagnostic Test Results:  none     ASSESSMENT/PLAN:         BMI:   Estimated body mass index is 25.78 kg/m  as calculated from the following:    Height as of this encounter: 1.753 m (5' 9\").    Weight as of this encounter: 79.2 kg (174 lb 9.6 oz).         1. Slow transit constipation    We discussed in detail dietary changes for Mya Mack that may soften her stools, in particular increasing fiber (fruits, especially those that begin with the letter \"P\"/vegetables/bran) and decreasing foods that cause constipation (dairy/cooked carrots, bananas).  I recommend she increase her water intake and start an OTC stool softener, to be taken on a daily basis.    2. Hypertension goal BP (blood pressure) < 140/90  BP controlled. Low salt diet encouraged as well as regular exercise, medication compliance discussed.      See Patient Instructions    ORLIN Branch [facility]  "

## 2018-12-12 NOTE — PATIENT INSTRUCTIONS
At [facility], we strive to deliver an exceptional experience to you, every time we see you.  If you receive a survey in the mail, please send us back your thoughts. We really do value your feedback.    Your care team:                            Family Medicine Internal Medicine   MD Claudio Akers MD Shantel Branch-Fleming, MD Katya Georgiev PA-C Megan Hill, APRN BUNNY Delaney MD Pediatrics   Mateo Braun, SHAHNAZ Velasco, MD Mili Sheffield APRN CNP   MD Emma Zuluaga MD Deborah Mielke, MD Mirna Brown, APRN [facility]      Clinic hours: Monday - Thursday 7 am-7 pm; Fridays 7 am-5 pm.   Urgent care: Monday - Friday 11 am-9 pm; Saturday and Sunday 9 am-5 pm.  Pharmacy : Monday -Thursday 8 am-8 pm; Friday 8 am-6 pm; Saturday and Sunday 9 am-5 pm.     Clinic: [phone]   Pharmacy: [phone]

## 2019-05-21 ENCOUNTER — OFFICE VISIT (OUTPATIENT)
Dept: FAMILY MEDICINE | Facility: CLINIC | Age: 84
End: 2019-05-21
Payer: COMMERCIAL

## 2019-05-21 VITALS
TEMPERATURE: 98.3 F | BODY MASS INDEX: 25.92 KG/M2 | HEART RATE: 65 BPM | HEIGHT: 69 IN | DIASTOLIC BLOOD PRESSURE: 95 MMHG | SYSTOLIC BLOOD PRESSURE: 160 MMHG | OXYGEN SATURATION: 97 % | WEIGHT: 175 LBS

## 2019-05-21 DIAGNOSIS — R73.02 IMPAIRED GLUCOSE TOLERANCE: ICD-10-CM

## 2019-05-21 DIAGNOSIS — E78.5 HYPERLIPIDEMIA LDL GOAL <130: ICD-10-CM

## 2019-05-21 DIAGNOSIS — I10 HYPERTENSION GOAL BP (BLOOD PRESSURE) < 140/90: Primary | ICD-10-CM

## 2019-05-21 LAB
ANION GAP SERPL CALCULATED.3IONS-SCNC: 9 MMOL/L (ref 3–14)
BUN SERPL-MCNC: 13 MG/DL (ref 7–30)
CALCIUM SERPL-MCNC: 9.4 MG/DL (ref 8.5–10.1)
CHLORIDE SERPL-SCNC: 105 MMOL/L (ref 94–109)
CO2 SERPL-SCNC: 28 MMOL/L (ref 20–32)
CREAT SERPL-MCNC: 0.89 MG/DL (ref 0.52–1.04)
GFR SERPL CREATININE-BSD FRML MDRD: 61 ML/MIN/{1.73_M2}
GLUCOSE SERPL-MCNC: 99 MG/DL (ref 70–99)
LDLC SERPL DIRECT ASSAY-MCNC: 76 MG/DL
POTASSIUM SERPL-SCNC: 3.4 MMOL/L (ref 3.4–5.3)
SODIUM SERPL-SCNC: 142 MMOL/L (ref 133–144)

## 2019-05-21 PROCEDURE — 99214 OFFICE O/P EST MOD 30 MIN: CPT | Performed by: PREVENTIVE MEDICINE

## 2019-05-21 PROCEDURE — 80048 BASIC METABOLIC PNL TOTAL CA: CPT | Performed by: PREVENTIVE MEDICINE

## 2019-05-21 PROCEDURE — 82043 UR ALBUMIN QUANTITATIVE: CPT | Performed by: PREVENTIVE MEDICINE

## 2019-05-21 PROCEDURE — 83721 ASSAY OF BLOOD LIPOPROTEIN: CPT | Performed by: PREVENTIVE MEDICINE

## 2019-05-21 PROCEDURE — 36415 COLL VENOUS BLD VENIPUNCTURE: CPT | Performed by: PREVENTIVE MEDICINE

## 2019-05-21 RX ORDER — AMLODIPINE BESYLATE 5 MG/1
5 TABLET ORAL DAILY
Qty: 30 TABLET | Refills: 1 | Status: SHIPPED | OUTPATIENT
Start: 2019-05-21 | End: 2019-07-25

## 2019-05-21 ASSESSMENT — MIFFLIN-ST. JEOR: SCORE: 1315.23

## 2019-05-21 ASSESSMENT — PAIN SCALES - GENERAL: PAINLEVEL: NO PAIN (0)

## 2019-05-21 NOTE — PATIENT INSTRUCTIONS
At [facility], we strive to deliver an exceptional experience to you, every time we see you.  If you receive a survey in the mail, please send us back your thoughts. We really do value your feedback.    Your care team:                            Family Medicine Internal Medicine   MD Claudio Akers MD Shantel Branch-Fleming, MD Katya Georgiev PA-C Megan Hill, APRN BUNNY Delaney MD Pediatrics   Mateo Braun, SHAHNAZ Velasco, MD Mili Sheffield APRN CNP   MD Emma Zuluaga MD Deborah Mielke, MD Mirna Brown, APRN [facility]      Clinic hours: Monday - Thursday 7 am-7 pm; Fridays 7 am-5 pm.   Urgent care: Monday - Friday 11 am-9 pm; Saturday and Sunday 9 am-5 pm.  Pharmacy : Monday -Thursday 8 am-8 pm; Friday 8 am-6 pm; Saturday and Sunday 9 am-5 pm.     Clinic: [phone]   Pharmacy: [phone]

## 2019-05-21 NOTE — LETTER
May 22, 2019        Mya Mack  [address] [facility] [place] [address]        Dear Mya Mack,     Urine sample did not show any abnormal protein.   LDL cholesterol is at goal for you.   Electrolytes, glucose and kidney function are normal.   Plan of care and follow up as discussed in clinic.   Please let me know if you have any questions and thank you for choosing [facility].     Regards,     Loretta Lopez MD MPH/AB    Resulted Orders   BASIC METABOLIC PANEL   Result Value Ref Range    Sodium 142 133 - 144 mmol/L    Potassium 3.4 3.4 - 5.3 mmol/L    Chloride 105 94 - 109 mmol/L    Carbon Dioxide 28 20 - 32 mmol/L    Anion Gap 9 3 - 14 mmol/L    Glucose 99 70 - 99 mg/dL      Comment:      Non Fasting    Urea Nitrogen 13 7 - 30 mg/dL    Creatinine 0.89 0.52 - 1.04 mg/dL    GFR Estimate 61 >60 mL/min/[1.73_m2]      Comment:      Non  GFR Calc  Starting 12/18/2018, serum creatinine based estimated GFR (eGFR) will be   calculated using the Chronic Kidney Disease Epidemiology Collaboration   (CKD-EPI) equation.      GFR Estimate If Black 70 >60 mL/min/[1.73_m2]      Comment:       GFR Calc  Starting 12/18/2018, serum creatinine based estimated GFR (eGFR) will be   calculated using the Chronic Kidney Disease Epidemiology Collaboration   (CKD-EPI) equation.      Calcium 9.4 8.5 - 10.1 mg/dL   Albumin Random Urine Quantitative with Creat Ratio   Result Value Ref Range    Creatinine Urine 72 mg/dL    Albumin Urine mg/L 16 mg/L    Albumin Urine mg/g Cr 21.65 0 - 25 mg/g Cr   LDL cholesterol direct   Result Value Ref Range    LDL Cholesterol Direct 76 <100 mg/dL      Comment:      Desirable:       <100 mg/dl

## 2019-05-21 NOTE — PROGRESS NOTES
Subjective     Mya Mack is a 83 year old female who presents to clinic today for the following health issues:    HPI   Hypertension Follow-up      Do you check your blood pressure regularly outside of the clinic? No     Are you following a low salt diet? Yes    Are your blood pressures ever more than 140 on the top number (systolic) OR more   than 90 on the bottom number (diastolic), for example 140/90? Yes    Amount of exercise or physical activity: None    Problems taking medications regularly: No    Medication side effects: none    Diet: low salt    Seen at the dentist a few days ago, BP was high 222/116    Had not been taking the BP medication as prescribed, has restarted    Now taking hydrochlorothiazide and Lisinopril     No headaches, no vision changes, no focal weakness, no dizziness, no hematuria, no pedal edema.       {  Patient Active Problem List   Diagnosis     Obesity     Impaired glucose tolerance     Hypertension goal BP (blood pressure) < 140/90     Hyperlipidemia LDL goal <130     Advanced directives, counseling/discussion     Primary osteoarthritis of right knee     Slow transit constipation     Past Surgical History:   Procedure Laterality Date     C NONSPECIFIC PROCEDURE  2003    flex sig     COLONOSCOPY  2010    due 2013     HYSTERECTOMY, PAP NO LONGER INDICATED       SURGICAL HISTORY OF -       [place]       Social History     Tobacco Use     Smoking status: Never Smoker     Smokeless tobacco: Never Used   Substance Use Topics     Alcohol use: Yes     Comment: 0-1 drinks per week     Family History   Problem Relation Age of Onset     Diabetes Father         type 2     Hypertension Father      Coronary Artery Disease No family hx of      Breast Cancer No family hx of      Colon Cancer No family hx of      Thyroid Disease No family hx of      Depression No family hx of      Anxiety Disorder No family hx of          Current Outpatient Medications   Medication Sig Dispense Refill      "amLODIPine (NORVASC) 5 MG tablet Take 1 tablet (5 mg) by mouth daily 30 tablet 1     CALCIUM 600 + D 600-200 MG-UNIT OR TABS 1 daily 3 MONTHS 1 YEAR     lisinopril (PRINIVIL/ZESTRIL) 20 MG tablet Take 1 tablet (20 mg) by mouth daily 90 tablet 3     VITAMIN B-6 100 MG OR TABS 1 TABLET DAILY       Allergies   Allergen Reactions     Nkda [No Known Drug Allergies]      BP Readings from Last 3 Encounters:   05/21/19 (!) 160/95   12/12/18 150/70   10/10/18 138/82    Wt Readings from Last 3 Encounters:   05/21/19 79.4 kg (175 lb)   12/12/18 79.2 kg (174 lb 9.6 oz)   10/10/18 82.2 kg (181 lb 4 oz)                    Reviewed and updated as needed this visit by Provider  Tobacco  Allergies  Meds  Problems  Med Hx  Surg Hx  Fam Hx         Review of Systems   ROS COMP: Constitutional, HEENT, cardiovascular, pulmonary, gi and gu systems are negative, except as otherwise noted.      Objective    BP (!) 160/95   Pulse 65   Temp 98.3  F (36.8  C) (Oral)   Ht 1.74 m (5' 8.5\")   Wt 79.4 kg (175 lb)   LMP 07/05/1990   SpO2 97%   Breastfeeding? No   BMI 26.22 kg/m    Body mass index is 26.22 kg/m .  Physical Exam       GENERAL APPEARANCE: healthy, alert and no distress  EYES: Eyes grossly normal to inspection and conjunctivae and sclerae normal  HENT: nose and mouth without ulcers or lesions  NECK: no adenopathy and trachea midline and normal to palpation  RESP: lungs clear to auscultation - no rales, rhonchi or wheezes  CV: regular rates and rhythm, normal S1 S2, no S3 or S4 and no murmur, click or rub  ABDOMEN: soft, non-tender and no rebound or guarding   MS: extremities normal- no gross deformities noted and peripheral pulses normal  SKIN: no suspicious lesions or rashes  NEURO: Normal strength and tone, mentation intact and speech normal  PSYCH: mentation appears normal      Diagnostic Test Results:  Labs reviewed in Epic  No results found for this or any previous visit (from the past 24 hour(s)).        Assessment " "& Plan     Mya was seen today for hypertension.    Diagnoses and all orders for this visit:    Hypertension goal BP (blood pressure) < 140/90  -     BASIC METABOLIC PANEL  -     amLODIPine (NORVASC) 5 MG tablet; Take 1 tablet (5 mg) by mouth daily  -     Albumin Random Urine Quantitative with Creat Ratio  -     LDL cholesterol direct  -Elevated readings  -Had not been taking medication as prescribed  -Increased urination with the hydrochlorothiazide 12.5 mg , hence stopped this and started Amlodipine 5 mg daily  -Blood pressure check on Ancillary in 2 weeks, if not at goal then plan to increase Amlodipine to 10 mg daily     Hyperlipidemia LDL goal <130  -     LDL cholesterol direct    Impaired glucose tolerance  -last glucose was 86   -Has been losing weight       BMI:   Estimated body mass index is 26.22 kg/m  as calculated from the following:    Height as of this encounter: 1.74 m (5' 8.5\").    Weight as of this encounter: 79.4 kg (175 lb).           Regular exercise  See Patient Instructions    Return in about 2 weeks (around 6/4/2019) for BP Recheck.    Loretta Lopez MD MPH    [facility]    "

## 2019-05-22 LAB
CREAT UR-MCNC: 72 MG/DL
MICROALBUMIN UR-MCNC: 16 MG/L
MICROALBUMIN/CREAT UR: 21.65 MG/G CR (ref 0–25)

## 2019-05-22 NOTE — RESULT ENCOUNTER NOTE
Please send a letter:    Dear Mya Mack,    Urine sample did not show any abnormal protein.  LDL cholesterol is at goal for you.  Electrolytes, glucose and kidney function are normal.  Plan of care and follow up as discussed in clinic.  Please let me know if you have any questions and thank you for choosing [facility].    Regards,    Loretta Lopez MD MPH

## 2019-07-20 DIAGNOSIS — I10 HYPERTENSION GOAL BP (BLOOD PRESSURE) < 140/90: ICD-10-CM

## 2019-07-20 NOTE — TELEPHONE ENCOUNTER
"Requested Prescriptions   Pending Prescriptions Disp Refills     amLODIPine (NORVASC) 5 MG tablet [Pharmacy Med Name: amLODIPine Besylate Oral Tablet 5 MG] 30 tablet 0     Sig: TAKE ONE TABLET BY MOUTH ONE TIME DAILY         Last Written Prescription Date:  5/21/19  Last Fill Quantity: 30,  # refills: 1   Last Office Visit with [facility], [facility] or [facility] prescribing provider:  5/21/19   Future Office Visit:         Calcium Channel Blockers Protocol  Failed - 7/20/2019  3:48 PM        Failed - Blood pressure under 140/90 in past 12 months     BP Readings from Last 3 Encounters:   05/21/19 (!) 160/95   12/12/18 150/70   10/10/18 138/82                 Passed - Recent (12 mo) or future (30 days) visit within the authorizing provider's specialty     Patient had office visit in the last 12 months or has a visit in the next 30 days with authorizing provider or within the authorizing provider's specialty.  See \"Patient Info\" tab in inbasket, or \"Choose Columns\" in Meds & Orders section of the refill encounter.              Passed - Medication is active on med list        Passed - Patient is age 18 or older        Passed - No active pregnancy on record        Passed - Normal serum creatinine on file in past 12 months     Recent Labs   Lab Test 05/21/19  1145   CR 0.89             Passed - No positive pregnancy test in past 12 months            Jae Faarax  [facility]    "

## 2019-07-20 NOTE — LETTER
Mya Mack  [address] [facility] [address]          07/24/19      Dear Mya Mack        At [facility] we care about your health and are committed to providing quality patient care. Regular appointments are a vital part of the care and management of your health and can help prevent many of the complications that can occur.      It has come to our attention that you are due for a blood pressure recheck appointment. Please call [facility] at [phone] soon to schedule your appointment.    If you have transferred care to another clinic please call to inform us so that we do not continue to send you reminder letters.      Sincerely,      [facility] Care Team

## 2019-07-24 RX ORDER — AMLODIPINE BESYLATE 5 MG/1
TABLET ORAL
Qty: 30 TABLET | Refills: 0 | OUTPATIENT
Start: 2019-07-24

## 2019-07-24 NOTE — TELEPHONE ENCOUNTER
Called and person answering phone picked up the call but did not speak. Sending appt due letter.  Belgica Smith MA/  For Teams Michelle

## 2019-07-24 NOTE — TELEPHONE ENCOUNTER
Routing to RN pool to remove t'd up medication as I can not and can not close encounter because of this.  Belgica Smith MA/  For Teams Michelle

## 2019-07-25 NOTE — TELEPHONE ENCOUNTER
Patient cannot get into ancillary until next week  Can you send over a small fill until she can get in

## 2019-07-25 NOTE — TELEPHONE ENCOUNTER
Patient cannot get in for BP check until Monday 7/29/19 - asking for one more julia refill to get her to her appointment. Please advise.    Mindy Dill, ELLENN, RN, PHN

## 2019-07-29 ENCOUNTER — ALLIED HEALTH/NURSE VISIT (OUTPATIENT)
Dept: NURSING | Facility: CLINIC | Age: 84
End: 2019-07-29
Payer: COMMERCIAL

## 2019-07-29 VITALS — DIASTOLIC BLOOD PRESSURE: 74 MMHG | SYSTOLIC BLOOD PRESSURE: 138 MMHG | HEART RATE: 60 BPM

## 2019-07-29 DIAGNOSIS — I10 HYPERTENSION GOAL BP (BLOOD PRESSURE) < 140/90: Primary | ICD-10-CM

## 2019-07-29 NOTE — Clinical Note
BP me just filled for 1 month supply. Patient seen for ancillary with BP readings for review.Murali Celaya CMA

## 2019-07-29 NOTE — NURSING NOTE
Ancillary BP check    HTN Guidelines:  Age 18-59 BP range:  Less than 140/90  Age 60-85 with Diabetes:  Less than 140/90  Age 60-85 without Diabetes:  less than 150/90        Mya Mack is a 83 year old female who comes in today for a Blood Pressure check.    Patient is taking medication as prescribed  Patient is tolerating medications well.  Patient is monitoring Blood Pressure at [facility].  Average readings if yes are 140-170s/70-90s    BP goal: < 150/90 mmHg (patient 60+ years of age without diabetes)    BP reading today: Passed     BP Readings from Last 1 Encounters:   07/29/19 138/74     A large cuff was used.  Pulse: 60    Vitals reviewed     Each reading recorded in vital signs section of chart: yes    Symptoms: none    Need for urgent appointment, based on criteria in ancillary BP workflow (elevated blood pressure and any of the following: dizziness, blurry vision, lightheadedness, severe shortness of breath, chest pain or visual disturbance): No       Plan: At goal follow up as directed by provider.      Completed by: Murali Celaya [facility]

## 2019-07-31 RX ORDER — AMLODIPINE BESYLATE 5 MG/1
5 TABLET ORAL DAILY
Qty: 90 TABLET | Refills: 1 | Status: SHIPPED | OUTPATIENT
Start: 2019-07-31 | End: 2019-12-19

## 2019-07-31 RX ORDER — AMLODIPINE BESYLATE 5 MG/1
5 TABLET ORAL DAILY
Qty: 10 TABLET | Refills: 0 | OUTPATIENT
Start: 2019-07-31

## 2019-07-31 NOTE — PROGRESS NOTES
Noted, blood pressure is at goal. Sent in a 90 day supply on the medication.  Loretta Lopez MD MPH

## 2019-11-18 DIAGNOSIS — I10 HYPERTENSION GOAL BP (BLOOD PRESSURE) < 140/90: ICD-10-CM

## 2019-11-18 RX ORDER — LISINOPRIL 20 MG/1
TABLET ORAL
Qty: 30 TABLET | Refills: 0 | Status: SHIPPED | OUTPATIENT
Start: 2019-11-18 | End: 2019-11-22

## 2019-11-18 NOTE — TELEPHONE ENCOUNTER
"Requested Prescriptions   Pending Prescriptions Disp Refills     lisinopril (PRINIVIL/ZESTRIL) 20 MG tablet [Pharmacy Med Name: Lisinopril Oral Tablet 20 MG] 90 tablet 2     Sig: Take 1 tablet by mouth daily  Last Written Prescription Date:  10/10/2018  Last Fill Quantity: 90 tablet,  # refills: 3   Last Office Visit: 5/21/2019   Future Office Visit:            ACE Inhibitors (Including Combos) Protocol Failed - 11/18/2019  9:07 AM        Failed - Recent (12 mo) or future (30 days) visit within the authorizing provider's specialty     Patient has had an office visit with the authorizing provider or a provider within the authorizing providers department within the previous 12 mos or has a future within next 30 days. See \"Patient Info\" tab in inbasket, or \"Choose Columns\" in Meds & Orders section of the refill encounter.            Passed - Blood pressure under 140/90 in past 12 months     BP Readings from Last 3 Encounters:   07/29/19 138/74   05/21/19 (!) 160/95   12/12/18 150/70           Passed - Medication is active on med list        Passed - Patient is age 18 or older        Passed - No active pregnancy on record        Passed - Normal serum creatinine on file in past 12 months     Recent Labs   Lab Test 05/21/19  1145   CR 0.89           Passed - Normal serum potassium on file in past 12 months     Recent Labs   Lab Test 05/21/19  1145   POTASSIUM 3.4           Passed - No positive pregnancy test within past 12 months          "

## 2019-11-18 NOTE — LETTER
November 19, 2019      Mya Mack  [address] [facility] [address]        Dear Mya,     In order to ensure we are providing the best quality care, we have reviewed your chart and see   that you are due for :      Annual physical      Please call the clinic at your earliest convenience to schedule an appointment.    We greatly appreciate the opportunity to serve you and thank you for trusting us with your health care.      Your health care team at [facility]         Sincerely,        Edelmira Ramirez MD, MD

## 2019-11-19 NOTE — TELEPHONE ENCOUNTER
HW Reception - Medication is being filled for 1 time refill only due to:  Patient needs to be seen because it has been more than one year since last visit. last office visit with house 10/10/18    Signed Prescriptions:                        Disp   Refills    lisinopril (PRINIVIL/ZESTRIL) 20 MG tablet 30 tab*0        Sig: Take 1 tablet by mouth daily  Authorizing Provider: TONIA PAEZ  Ordering User: BLAYNE VELASQUEZ

## 2019-11-20 DIAGNOSIS — I10 HYPERTENSION GOAL BP (BLOOD PRESSURE) < 140/90: ICD-10-CM

## 2019-11-20 NOTE — TELEPHONE ENCOUNTER
"Looks like pharmacy is requesting pt's PCP to fill medication, julia refill sent by another provider at different clinic.  Patient has been seen here within the last year      Requested Prescriptions   Pending Prescriptions Disp Refills     lisinopril (PRINIVIL/ZESTRIL) 20 MG tablet  Last Written Prescription Date:  11/18/19  Last Fill Quantity: 30,  # refills: 0   Last [facility] Visit with [facility], [facility] or [facility] prescribing provider:  05/21/19Lewis   Future Office Visit:    30 tablet 0     Sig: Take 1 tablet (20 mg) by mouth daily       ACE Inhibitors (Including Combos) Protocol Passed - 11/20/2019  1:35 PM        Passed - Blood pressure under 140/90 in past 12 months     BP Readings from Last 3 Encounters:   07/29/19 138/74   05/21/19 (!) 160/95   12/12/18 150/70                 Passed - Recent (12 mo) or future (30 days) visit within the authorizing provider's specialty     Patient has had an office visit with the authorizing provider or a provider within the authorizing providers department within the previous 12 mos or has a future within next 30 days. See \"Patient Info\" tab in inbasket, or \"Choose Columns\" in Meds & Orders section of the refill encounter.              Passed - Medication is active on med list        Passed - Patient is age 18 or older        Passed - No active pregnancy on record        Passed - Normal serum creatinine on file in past 12 months     Recent Labs   Lab Test 05/21/19  1145   CR 0.89             Passed - Normal serum potassium on file in past 12 months     Recent Labs   Lab Test 05/21/19  1145   POTASSIUM 3.4             Passed - No positive pregnancy test within past 12 months            "

## 2019-11-22 NOTE — TELEPHONE ENCOUNTER
Routing refill request to provider for review/approval because:  Out of range:  Blood pressure >150/90  Cheryl Cali RN  [facility]

## 2019-11-24 RX ORDER — LISINOPRIL 20 MG/1
20 TABLET ORAL DAILY
Qty: 30 TABLET | Refills: 0 | Status: SHIPPED | OUTPATIENT
Start: 2019-11-24 | End: 2020-01-22

## 2019-12-17 DIAGNOSIS — I10 HYPERTENSION GOAL BP (BLOOD PRESSURE) < 140/90: ICD-10-CM

## 2019-12-17 NOTE — TELEPHONE ENCOUNTER
"Requested Prescriptions   Pending Prescriptions Disp Refills     amLODIPine (NORVASC) 5 MG tablet [Pharmacy Med Name: amLODIPine Besylate Oral Tablet 5 MG] 90 tablet 0     Sig: Take 1 tablet (5 mg) by mouth daily         Last Written Prescription Date:  7/31/19  Last Fill Quantity: 90,  # refills: 1   Last Office Visit with [facility] or [facility] prescribing provider:  12/17/19   Future Office Visit:         Calcium Channel Blockers Protocol  Passed - 12/17/2019  1:42 PM        Passed - Blood pressure under 140/90 in past 12 months     BP Readings from Last 3 Encounters:   07/29/19 138/74   05/21/19 (!) 160/95   12/12/18 150/70                 Passed - Recent (12 mo) or future (30 days) visit within the authorizing provider's specialty     Patient has had an office visit with the authorizing provider or a provider within the authorizing providers department within the previous 12 mos or has a future within next 30 days. See \"Patient Info\" tab in inbasket, or \"Choose Columns\" in Meds & Orders section of the refill encounter.              Passed - Medication is active on med list        Passed - Patient is age 18 or older        Passed - No active pregnancy on record        Passed - Normal serum creatinine on file in past 12 months     Recent Labs   Lab Test 05/21/19  1145   CR 0.89             Passed - No positive pregnancy test in past 12 months            Jae Faarax  [facility]    "

## 2019-12-19 RX ORDER — AMLODIPINE BESYLATE 5 MG/1
5 TABLET ORAL DAILY
Qty: 90 TABLET | Refills: 0 | Status: SHIPPED | OUTPATIENT
Start: 2019-12-19 | End: 2020-05-07

## 2019-12-19 NOTE — TELEPHONE ENCOUNTER
Prescription approved per G Refill Protocol.    Mila Still RN  [facility]/ [facility]

## 2020-01-18 DIAGNOSIS — I10 HYPERTENSION GOAL BP (BLOOD PRESSURE) < 140/90: ICD-10-CM

## 2020-01-20 NOTE — TELEPHONE ENCOUNTER
"Requested Prescriptions   Pending Prescriptions Disp Refills     lisinopril (PRINIVIL/ZESTRIL) 20 MG tablet [Pharmacy Med Name: Lisinopril Oral Tablet 20 MG] 90 tablet 2     Sig: Take 1 tablet by mouth daily  Last Written Prescription Date:  11/24/2019  Last Fill Quantity: 30 tablet,  # refills: 0   Last Office Visit: 5/21/2019   Future Office Visit:            ACE Inhibitors (Including Combos) Protocol Failed - 1/18/2020 12:08 PM        Failed - Recent (12 mo) or future (30 days) visit within the authorizing provider's specialty     Patient has had an office visit with the authorizing provider or a provider within the authorizing providers department within the previous 12 mos or has a future within next 30 days. See \"Patient Info\" tab in inbasket, or \"Choose Columns\" in Meds & Orders section of the refill encounter.            Passed - Blood pressure under 140/90 in past 12 months     BP Readings from Last 3 Encounters:   07/29/19 138/74   05/21/19 (!) 160/95   12/12/18 150/70           Passed - Medication is active on med list        Passed - Patient is age 18 or older        Passed - No active pregnancy on record        Passed - Normal serum creatinine on file in past 12 months     Recent Labs   Lab Test 05/21/19  1145   CR 0.89           Passed - Normal serum potassium on file in past 12 months     Recent Labs   Lab Test 05/21/19  1145   POTASSIUM 3.4           Passed - No positive pregnancy test within past 12 months          "

## 2020-01-22 RX ORDER — LISINOPRIL 20 MG/1
TABLET ORAL
Qty: 90 TABLET | Refills: 0 | Status: SHIPPED | OUTPATIENT
Start: 2020-01-22 | End: 2020-04-14

## 2020-04-14 DIAGNOSIS — I10 HYPERTENSION GOAL BP (BLOOD PRESSURE) < 140/90: ICD-10-CM

## 2020-04-14 RX ORDER — LISINOPRIL 20 MG/1
20 TABLET ORAL DAILY
Qty: 90 TABLET | Refills: 0 | Status: SHIPPED | OUTPATIENT
Start: 2020-04-14 | End: 2020-07-08

## 2020-04-14 NOTE — TELEPHONE ENCOUNTER
Requesting refill of lisinopril from Dr Ramirez.  Patient has 7 pills left.  Please call patient with any questions.  OK to LM on VM.    Wants to transfer her meds to [facility], [address], [place] [address]/ Phone number is [phone].  Formerly got this at [facility].

## 2020-04-14 NOTE — TELEPHONE ENCOUNTER
Last Written Prescription Date:  1/22/2020  Last Fill Quantity: 90,  # refills: 0   Last office visit: 5/21/2019 ([facility])with prescribing provider:  10/10/18 with Dr Ramirez   Future Office Visit:    Routing to new listed PCP.

## 2020-04-14 NOTE — TELEPHONE ENCOUNTER
This writer attempted to contact Patient on 04/14/20      Reason for call Message below  and left message.      If patient calls back:   1st floor [place] Care Team (MA/TC) called. Inform patient that someone from the team will contact them, document that pt called and route to care team.         Kalani Weeks

## 2020-07-05 DIAGNOSIS — I10 HYPERTENSION GOAL BP (BLOOD PRESSURE) < 140/90: ICD-10-CM

## 2020-07-07 NOTE — TELEPHONE ENCOUNTER
Team, please contact patient and advise needs virtual visit for hypertension check up and medication refills.  Has been over 1 year since last visit (5/21/19)  Verify if has enough medication to last till visit.  Route encounter back to refill pool when complete.    Mila Still RN  [facility]/ [facility]

## 2020-07-08 ENCOUNTER — VIRTUAL VISIT (OUTPATIENT)
Dept: FAMILY MEDICINE | Facility: CLINIC | Age: 85
End: 2020-07-08
Payer: COMMERCIAL

## 2020-07-08 DIAGNOSIS — I10 HYPERTENSION GOAL BP (BLOOD PRESSURE) < 140/90: Primary | ICD-10-CM

## 2020-07-08 DIAGNOSIS — R73.02 IMPAIRED GLUCOSE TOLERANCE: ICD-10-CM

## 2020-07-08 PROCEDURE — 99213 OFFICE O/P EST LOW 20 MIN: CPT | Mod: 95 | Performed by: PREVENTIVE MEDICINE

## 2020-07-08 RX ORDER — LISINOPRIL 20 MG/1
20 TABLET ORAL DAILY
Qty: 90 TABLET | Refills: 3 | Status: SHIPPED | OUTPATIENT
Start: 2020-07-08 | End: 2021-05-18

## 2020-07-08 ASSESSMENT — PAIN SCALES - GENERAL: PAINLEVEL: NO PAIN (0)

## 2020-07-08 NOTE — TELEPHONE ENCOUNTER
Called and spoke to the patient and she states she has 11 pills and will call back to schedule a Telephone visit as she has no way to do a Video visit.  Belgica Smith MA  [facility]  2nd Floor  Primary Care

## 2020-07-08 NOTE — PROGRESS NOTES
"Mya Mack is a 84 year old female who is being evaluated via a billable telephone visit.      The patient has been notified of following:     \"This telephone visit will be conducted via a call between you and your physician/provider. We have found that certain health care needs can be provided without the need for a physical exam.  This service lets us provide the care you need with a short phone conversation.  If a prescription is necessary we can send it directly to your pharmacy.  If lab work is needed we can place an order for that and you can then stop by our lab to have the test done at a later time.    Telephone visits are billed at different rates depending on your insurance coverage. During this emergency period, for some insurers they may be billed the same as an in-person visit.  Please reach out to your insurance provider with any questions.    If during the course of the call the physician/provider feels a telephone visit is not appropriate, you will not be charged for this service.\"    Patient has given verbal consent for Telephone visit?  Yes    What phone number would you like to be contacted at? [phone]    How would you like to obtain your AVS? Mail a copy    Subjective     Mya Mack is a 84 year old female who presents via phone visit today for the following health issues:    HPI  Hyperlipidemia Follow-Up      Are you regularly taking any medication or supplement to lower your cholesterol?   No    Are you having muscle aches or other side effects that you think could be caused by your cholesterol lowering medication?  No    Hypertension Follow-up      Do you check your blood pressure regularly outside of the clinic? Yes     Are you following a low salt diet? Yes    Are your blood pressures ever more than 140 on the top number (systolic) OR more   than 90 on the bottom number (diastolic), for example 140/90? Yes      How many servings of fruits and vegetables do you eat daily? "  2-3    On average, how many sweetened beverages do you drink each day (Examples: soda, juice, sweet tea, etc.  Do NOT count diet or artificially sweetened beverages)?   2-3    How many days per week do you exercise enough to make your heart beat faster? 3 or less    How many minutes a day do you exercise enough to make your heart beat faster? 9 or less    How many days per week do you miss taking your medication? 0    No chest pain  No headaches  No pedal edema  No dizziness  Has been checking blood pressure at home  No falls    Patient Active Problem List   Diagnosis     Obesity     Impaired glucose tolerance     Hypertension goal BP (blood pressure) < 140/90     Hyperlipidemia LDL goal <130     Advanced directives, counseling/discussion     Primary osteoarthritis of right knee     Slow transit constipation     Past Surgical History:   Procedure Laterality Date     C NONSPECIFIC PROCEDURE  2003    flex sig     COLONOSCOPY  2010    due 2013     HYSTERECTOMY, PAP NO LONGER INDICATED       SURGICAL HISTORY OF -       CARMENZA       Social History     Tobacco Use     Smoking status: Never Smoker     Smokeless tobacco: Never Used   Substance Use Topics     Alcohol use: Yes     Comment: 0-1 drinks per week     Family History   Problem Relation Age of Onset     Diabetes Father         type 2     Hypertension Father      Coronary Artery Disease No family hx of      Breast Cancer No family hx of      Colon Cancer No family hx of      Thyroid Disease No family hx of      Depression No family hx of      Anxiety Disorder No family hx of          Current Outpatient Medications   Medication Sig Dispense Refill     lisinopril (ZESTRIL) 20 MG tablet Take 1 tablet (20 mg) by mouth daily 90 tablet 3     amLODIPine (NORVASC) 5 MG tablet TAKE 1 TAB BY MOUTH EVERY DAY. 90 tablet 3     CALCIUM 600 + D 600-200 MG-UNIT OR TABS 1 daily 3 MONTHS 1 YEAR     VITAMIN B-6 100 MG OR TABS 1 TABLET DAILY       Allergies   Allergen Reactions     Nkda  [No Known Drug Allergies]      BP Readings from Last 3 Encounters:   07/29/19 138/74   05/21/19 (!) 160/95   12/12/18 150/70    Wt Readings from Last 3 Encounters:   05/21/19 79.4 kg (175 lb)   12/12/18 79.2 kg (174 lb 9.6 oz)   10/10/18 82.2 kg (181 lb 4 oz)                    Reviewed and updated as needed this visit by Provider  Tobacco  Allergies  Meds  Problems  Med Hx  Surg Hx  Fam Hx         Review of Systems   Constitutional, HEENT, cardiovascular, pulmonary, gi and gu systems are negative, except as otherwise noted.       Objective   Reported vitals:  LMP 07/05/1990    healthy, alert and no distress  PSYCH: Alert and oriented times 3; coherent speech, normal   rate and volume, able to articulate logical thoughts, able   to abstract reason, no tangential thoughts, no hallucinations   or delusions  Her affect is normal  RESP: No cough, no audible wheezing, able to talk in full sentences  Remainder of exam unable to be completed due to telephone visits    Diagnostic Test Results:  Labs reviewed in Epic  No results found for this or any previous visit (from the past 24 hour(s)).        Assessment/Plan:  1. Hypertension goal BP (blood pressure) < 140/90  -at goal  -continue current medication   -Amlodipine 5 mg daily   -will come in for labs in the next few weeks   - lisinopril (ZESTRIL) 20 MG tablet; Take 1 tablet (20 mg) by mouth daily  Dispense: 90 tablet; Refill: 3  - Lipid panel reflex to direct LDL Non-fasting; Future  - Basic metabolic panel; Future  - Hemoglobin A1c; Future  - Albumin Random Urine Quantitative with Creat Ratio; Future    2. Impaired glucose tolerance  - Lipid panel reflex to direct LDL Non-fasting; Future  - Basic metabolic panel; Future  - Hemoglobin A1c; Future  - Albumin Random Urine Quantitative with Creat Ratio; Future    Return in about 1 year (around 7/8/2021) for Routine Visit.      Phone call duration:  8 minutes    Loretta Lopez MD MPH

## 2020-07-09 RX ORDER — LISINOPRIL 20 MG/1
TABLET ORAL
Qty: 90 TABLET | Refills: 0 | OUTPATIENT
Start: 2020-07-09

## 2020-07-09 NOTE — TELEPHONE ENCOUNTER
"Noted.  Patient had virtual visit with provider yesterday and medication was refilled by PCP.  Current request in this encounter was denied to pharmacy as \"duplicate\".    Mila Still RN  [facility]      "

## 2020-08-08 DIAGNOSIS — R73.02 IMPAIRED GLUCOSE TOLERANCE: ICD-10-CM

## 2020-08-08 DIAGNOSIS — I10 HYPERTENSION GOAL BP (BLOOD PRESSURE) < 140/90: ICD-10-CM

## 2020-08-08 LAB — HBA1C MFR BLD: 5.4 % (ref 0–5.6)

## 2020-08-08 PROCEDURE — 83036 HEMOGLOBIN GLYCOSYLATED A1C: CPT | Performed by: PREVENTIVE MEDICINE

## 2020-08-08 PROCEDURE — 36415 COLL VENOUS BLD VENIPUNCTURE: CPT | Performed by: PREVENTIVE MEDICINE

## 2020-08-08 PROCEDURE — 80048 BASIC METABOLIC PNL TOTAL CA: CPT | Performed by: PREVENTIVE MEDICINE

## 2020-08-08 PROCEDURE — 82043 UR ALBUMIN QUANTITATIVE: CPT | Performed by: PREVENTIVE MEDICINE

## 2020-08-08 PROCEDURE — 80061 LIPID PANEL: CPT | Performed by: PREVENTIVE MEDICINE

## 2020-08-10 LAB
ANION GAP SERPL CALCULATED.3IONS-SCNC: 8 MMOL/L (ref 3–14)
BUN SERPL-MCNC: 11 MG/DL (ref 7–30)
CALCIUM SERPL-MCNC: 8.8 MG/DL (ref 8.5–10.1)
CHLORIDE SERPL-SCNC: 106 MMOL/L (ref 94–109)
CHOLEST SERPL-MCNC: 199 MG/DL
CO2 SERPL-SCNC: 28 MMOL/L (ref 20–32)
CREAT SERPL-MCNC: 0.82 MG/DL (ref 0.52–1.04)
CREAT UR-MCNC: 82 MG/DL
GFR SERPL CREATININE-BSD FRML MDRD: 66 ML/MIN/{1.73_M2}
GLUCOSE SERPL-MCNC: 93 MG/DL (ref 70–99)
HDLC SERPL-MCNC: 85 MG/DL
LDLC SERPL CALC-MCNC: 100 MG/DL
MICROALBUMIN UR-MCNC: 11 MG/L
MICROALBUMIN/CREAT UR: 13.66 MG/G CR (ref 0–25)
NONHDLC SERPL-MCNC: 114 MG/DL
POTASSIUM SERPL-SCNC: 3.4 MMOL/L (ref 3.4–5.3)
SODIUM SERPL-SCNC: 142 MMOL/L (ref 133–144)
TRIGL SERPL-MCNC: 70 MG/DL

## 2020-08-11 NOTE — RESULT ENCOUNTER NOTE
Please send a letter:    Dear Mya Mack,    Urine sample is not showing any abnormal protein.  LDL cholesterol is at goal for you.  Electrolytes, glucose and kidney function are normal.  Three month glucose number is normal, you do not have diabetes or pre diabetes.  Plan of care and follow up as discussed.     Regards,    Loretta Lopez MD MPH

## 2021-03-27 ENCOUNTER — TELEPHONE (OUTPATIENT)
Dept: FAMILY MEDICINE | Facility: CLINIC | Age: 86
End: 2021-03-27

## 2021-03-27 NOTE — TELEPHONE ENCOUNTER
Pt got a letter stating Dr Lopez leaving in November 2021. She wants to know which female dr will be her new dr?

## 2021-03-30 NOTE — TELEPHONE ENCOUNTER
Spoke to my supervisor and she states that Dr Lopez is not leaving and to see if the patient could give us a copy of the letter. Called and spoke to the patient and she will bring in this in to give to Hair Smith [facility]  2nd Floor  Primary Care

## 2021-04-02 NOTE — TELEPHONE ENCOUNTER
Patient has not dropped the letter off. Closing encounter for now.  Belgica Smith MA  [facility]  2nd Floor  Primary Care

## 2021-05-17 DIAGNOSIS — I10 HYPERTENSION GOAL BP (BLOOD PRESSURE) < 140/90: ICD-10-CM

## 2021-05-18 ENCOUNTER — VIRTUAL VISIT (OUTPATIENT)
Dept: FAMILY MEDICINE | Facility: CLINIC | Age: 86
End: 2021-05-18
Payer: COMMERCIAL

## 2021-05-18 DIAGNOSIS — Z13.220 LIPID SCREENING: ICD-10-CM

## 2021-05-18 DIAGNOSIS — I10 HYPERTENSION GOAL BP (BLOOD PRESSURE) < 140/90: Primary | ICD-10-CM

## 2021-05-18 DIAGNOSIS — R73.02 IMPAIRED GLUCOSE TOLERANCE: ICD-10-CM

## 2021-05-18 PROCEDURE — 99214 OFFICE O/P EST MOD 30 MIN: CPT | Mod: 95 | Performed by: PREVENTIVE MEDICINE

## 2021-05-18 RX ORDER — AMLODIPINE BESYLATE 5 MG/1
TABLET ORAL
Qty: 90 TABLET | Refills: 3 | Status: SHIPPED | OUTPATIENT
Start: 2021-05-18 | End: 2022-05-26

## 2021-05-18 RX ORDER — LISINOPRIL 20 MG/1
20 TABLET ORAL DAILY
Qty: 90 TABLET | Refills: 3 | Status: SHIPPED | OUTPATIENT
Start: 2021-05-18 | End: 2022-05-26

## 2021-05-18 RX ORDER — AMLODIPINE BESYLATE 5 MG/1
TABLET ORAL
Qty: 90 TABLET | Refills: 3 | OUTPATIENT
Start: 2021-05-18

## 2021-05-18 NOTE — TELEPHONE ENCOUNTER
Called pt and she understands  
Pt has appointment scheduled tomorrow.    No current bp.  Mónica Day BSN, RN    
Will provide refills during appointment today.   Thank you,  Loretta Lopez MD MPH   
2020

## 2021-05-18 NOTE — PROGRESS NOTES
Mya is a 85 year old who is being evaluated via a billable telephone visit.      What phone number would you like to be contacted at? [phone]  How would you like to obtain your AVS? Mail a copy    Assessment & Plan     Hypertension goal BP (blood pressure) < 140/90  -Refills provided  -continue current medication   -labs normal 8/2020  -will come in for repeat labs 8/21   - amLODIPine (NORVASC) 5 MG tablet  Dispense: 90 tablet; Refill: 3  - lisinopril (ZESTRIL) 20 MG tablet  Dispense: 90 tablet; Refill: 3  - Lipid panel reflex to direct LDL Fasting  - Basic metabolic panel  - Albumin Random Urine Quantitative with Creat Ratio  - Hemoglobin A1c    Lipid screening  - Lipid panel reflex to direct LDL Fasting    Impaired glucose tolerance  -  Lab Results   Component Value Date    A1C 5.4 08/08/2020    A1C  10/10/2018     Normal <5.7% Prediabetes 5.7-6.4%  Diabetes 6.5% or higher - adopted from ADA consensus   guidelines.      A1C 5.2 04/25/2017    A1C 6.0 02/23/2016    A1C 5.9 01/10/2014     - Hemoglobin A1c      25 minutes spent on the date of the encounter doing chart review, history and exam, documentation and further activities per the note         Return in about 1 year (around 5/18/2022) for Follow up, with me, in person.   Has not gotten the Covid vaccine yet as prefers to do a one dose vaccine, prefers not to do 2 shots. Trying to schedule one dose Covid vaccine.     Loretta Lopez MD MPH    [facility]    Anurag Roque is a 85 year old who presents for the following health issues   HPI     Hypertension Follow-up      Do you check your blood pressure regularly outside of the clinic? Yes     Are you following a low salt diet? Yes    Are your blood pressures ever more than 140 on the top number (systolic) OR more   than 90 on the bottom number (diastolic), for example 140/90? Yes    No chest pain  No dizziness  No pedal edema  No headaches  No vision changes   No  falls    History of impaired fasting glucose. No significant nocturia, no increased thirst, no burning in the feet.     Has not gotten Covid vaccine, trying to find a place where she get one shot only.   Lives alone in her own house  Manages everything herself, goes for groceries herself  No family close by, closest is in [place].       Review of Systems   Constitutional, HEENT, cardiovascular, pulmonary, gi and gu systems are negative, except as otherwise noted.      Objective    Vitals - Patient Reported  Systolic (Patient Reported): 138  Diastolic (Patient Reported): 89      Vitals:  No vitals were obtained today due to virtual visit.    Physical Exam   healthy, alert and no distress  PSYCH: Alert and oriented times 3; coherent speech, normal   rate and volume, able to articulate logical thoughts, able   to abstract reason, no tangential thoughts, no hallucinations   or delusions  Her affect is normal  RESP: No cough, no audible wheezing, able to talk in full sentences  Remainder of exam unable to be completed due to telephone visits            Phone call duration: 12 minutes

## 2022-06-20 DIAGNOSIS — I10 HYPERTENSION GOAL BP (BLOOD PRESSURE) < 140/90: ICD-10-CM

## 2022-06-22 NOTE — TELEPHONE ENCOUNTER
"Routing refill request to provider for review/approval because:  Labs not current    Requested Prescriptions   Pending Prescriptions Disp Refills    lisinopril (ZESTRIL) 20 MG tablet 90 tablet 0     Sig: Take 1 tablet (20 mg) by mouth daily        ACE Inhibitors (Including Combos) Protocol Failed - 6/20/2022  6:54 PM        Failed - Blood pressure under 140/90 in past 12 months       BP Readings from Last 3 Encounters:   07/29/19 138/74   05/21/19 (!) 160/95   12/12/18 150/70                 Failed - Recent (12 mo) or future (30 days) visit within the authorizing provider's specialty     Patient has had an office visit with the authorizing provider or a provider within the authorizing providers department within the previous 12 mos or has a future within next 30 days. See \"Patient Info\" tab in inbasket, or \"Choose Columns\" in Meds & Orders section of the refill encounter.              Failed - Normal serum creatinine on file in past 12 months     Recent Labs   Lab Test 08/08/20  1300   CR 0.82       Ok to refill medication if creatinine is low          Failed - Normal serum potassium on file in past 12 months     Recent Labs   Lab Test 08/08/20  1300   POTASSIUM 3.4               Passed - Medication is active on med list        Passed - Patient is age 18 or older        Passed - No active pregnancy on record        Passed - No positive pregnancy test within past 12 months            Fran Mujica RN BSN  [facility]          "

## 2022-06-23 RX ORDER — LISINOPRIL 20 MG/1
20 TABLET ORAL DAILY
Qty: 90 TABLET | Refills: 0 | Status: SHIPPED | OUTPATIENT
Start: 2022-06-23 | End: 2023-06-22

## 2022-09-09 DIAGNOSIS — I10 HYPERTENSION GOAL BP (BLOOD PRESSURE) < 140/90: ICD-10-CM

## 2022-09-09 RX ORDER — LISINOPRIL 20 MG/1
TABLET ORAL
Qty: 90 TABLET | Refills: 0 | OUTPATIENT
Start: 2022-09-09

## 2022-09-09 NOTE — TELEPHONE ENCOUNTER
Called and unable to leave a voice message. Patient need to schedule an apt  for further medication refills, per provider, patient is overdue for appointment and labs.  .Kai Ram Patient Registration

## 2022-10-07 DIAGNOSIS — I10 HYPERTENSION GOAL BP (BLOOD PRESSURE) < 140/90: ICD-10-CM

## 2022-10-07 RX ORDER — LISINOPRIL 20 MG/1
TABLET ORAL
Qty: 90 TABLET | Refills: 0 | OUTPATIENT
Start: 2022-10-07

## 2022-10-07 NOTE — LETTER
October 7, 2022        Mya Mack  [address]  [place] [address]        Dear Mya Mack,      At [facility] we care about your health and are committed to providing quality patient care. Regular appointments are a vital part of the care and management of your health and can help prevent many of the complications that can occur.      It has come to our attention that you are due for a medication follow up.  Please call [facility] at [phone] soon to schedule your follow up appointment.    If you have transferred care to another clinic please call to inform us so that we do not continue to send you reminder letters.      Sincerely,      [facility] Care Team

## 2022-10-18 DIAGNOSIS — I10 HYPERTENSION GOAL BP (BLOOD PRESSURE) < 140/90: ICD-10-CM

## 2022-10-18 NOTE — LETTER
October 20, 2022      Mya Mack  [address]  [place] [address]        Dear Mya Mack,      At [facility] we care about your health and are committed to providing quality patient care. Regular appointments are a vital part of the care and management of your health and can help prevent many of the complications that can occur.      It has come to our attention that you are due for a medication follow up appointment.  Please call [facility] at [phone] soon to schedule your follow up appointment.    If you have transferred care to another clinic please call to inform us so that we do not continue to send you reminder letters.      Sincerely,      [facility] Care Team

## 2022-10-19 RX ORDER — LISINOPRIL 20 MG/1
TABLET ORAL
Qty: 90 TABLET | Refills: 0 | OUTPATIENT
Start: 2022-10-19

## 2022-10-19 RX ORDER — AMLODIPINE BESYLATE 5 MG/1
TABLET ORAL
Qty: 90 TABLET | Refills: 0 | OUTPATIENT
Start: 2022-10-19

## 2022-10-20 NOTE — TELEPHONE ENCOUNTER
Over a year since last visit, OK to schedule a Virtual visit.  Thank you,  Loretta Lopez MD MPH   
Unable to lvm. Sent letter.   
denies pain/discomfort

## 2022-11-05 DIAGNOSIS — I10 HYPERTENSION GOAL BP (BLOOD PRESSURE) < 140/90: ICD-10-CM

## 2022-11-05 NOTE — LETTER
November 9, 2022      Mya Mack  [address]  [place] [place] [facility] [address]  \      Dear Mya Mack,      At [facility] we care about your health and are committed to providing quality patient care. Regular appointments are a vital part of the care and management of your health and can help prevent many of the complications that can occur.      It has come to our attention that you are due for a medication check. This can be virtual or in person.  Please call [facility] at [phone] soon to schedule your follow up appointment.    If you have transferred care to another clinic please call to inform us so that we do not continue to send you reminder letters.      Sincerely,      [facility] Care Team

## 2022-11-09 RX ORDER — LISINOPRIL 20 MG/1
TABLET ORAL
Qty: 90 TABLET | Refills: 0 | OUTPATIENT
Start: 2022-11-09

## 2022-11-25 DIAGNOSIS — I10 HYPERTENSION GOAL BP (BLOOD PRESSURE) < 140/90: ICD-10-CM

## 2022-11-25 RX ORDER — LISINOPRIL 20 MG/1
TABLET ORAL
Qty: 90 TABLET | Refills: 0 | OUTPATIENT
Start: 2022-11-25

## 2022-11-25 NOTE — LETTER
November 25, 2022          Mya Mack  [address]  [place] [facility] [place] [facility] [address]            Dear Mya Mack,      At [facility] we care about your health and are committed to providing quality patient care. Regular appointments are a vital part of the care and management of your health and can help prevent many of the complications that can occur.      It has come to our attention that you are OVERdue for an office visit for a medication follow up.  Please call [facility] at [phone] soon to schedule your follow up appointment.    If you have transferred care to another clinic please call to inform us so that we do not continue to send you reminder letters.      Sincerely,      [facility] Care Team

## 2022-12-11 DIAGNOSIS — I10 HYPERTENSION GOAL BP (BLOOD PRESSURE) < 140/90: ICD-10-CM

## 2022-12-11 NOTE — LETTER
December 12, 2022        Mya Mack  [address]  [place] [address]        Dear Mya Mack,      At [facility] we care about your health and are committed to providing quality patient care. Regular appointments are a vital part of the care and management of your health and can help prevent many of the complications that can occur.      It has come to our attention that you are due for a medication check office visit and labs.  Please call [facility] at [phone] soon to schedule your follow up appointment.    If you have transferred care to another clinic please call to inform us so that we do not continue to send you reminder letters.      Sincerely,      [facility] Care Team

## 2022-12-12 RX ORDER — LISINOPRIL 20 MG/1
TABLET ORAL
Qty: 90 TABLET | Refills: 0 | OUTPATIENT
Start: 2022-12-12

## 2022-12-12 NOTE — TELEPHONE ENCOUNTER
Due for appointment. OK to do a Virtual visit as long as she is willing to come in for labs since last labs were done in 2020.  Thank you,  Loretta Lopez MD MPH

## 2022-12-18 NOTE — TELEPHONE ENCOUNTER
Routing refill request to provider for review/approval because:  Labs not current:  Potassium, creatinine  -BP not at goal  -Overdue for annual office visit. Did not follow up for MA BP check, as instructed from 2/23/16 office visit.    Routing to covering providers and Reception.    Please review and sign if agree.    Reception-Please call patient to schedule annual office visit.    Thank you!  NEHEMIAS Luis, RN        Potassium   Date Value Ref Range Status   02/23/2016 3.5 3.4 - 5.3 mmol/L Final     Creatinine   Date Value Ref Range Status   02/23/2016 0.86 0.52 - 1.04 mg/dL Final     BP Readings from Last 3 Encounters:   02/23/16 140/82   02/19/15 116/62   02/05/15 126/74        Unknown if ever smoked

## 2023-05-23 DIAGNOSIS — I10 HYPERTENSION GOAL BP (BLOOD PRESSURE) < 140/90: ICD-10-CM

## 2023-05-23 NOTE — LETTER
5/23/2023      Mya Mack  [address]  [place] [address]        Guido Roque    We are unable to refill your medication at this time as you are due for an office visit. Please call [phone] to schedule this appointment.    Thank you      [facility]

## 2023-05-24 RX ORDER — AMLODIPINE BESYLATE 5 MG/1
TABLET ORAL
Qty: 90 TABLET | Refills: 0 | OUTPATIENT
Start: 2023-05-24

## 2023-05-24 RX ORDER — LISINOPRIL 20 MG/1
TABLET ORAL
Qty: 90 TABLET | Refills: 0 | OUTPATIENT
Start: 2023-05-24

## 2023-06-07 ENCOUNTER — TELEPHONE (OUTPATIENT)
Dept: FAMILY MEDICINE | Facility: CLINIC | Age: 88
End: 2023-06-07
Payer: COMMERCIAL

## 2023-06-07 NOTE — TELEPHONE ENCOUNTER
Patient Quality Outreach    Patient is due for the following:   Diabetes -  A1C      Topic Date Due     COVID-19 Vaccine (1) Never done     Diptheria Tetanus Pertussis (DTAP/TDAP/TD) Vaccine (1 - Tdap) 07/06/2010     Zoster (Shingles) Vaccine (2 of 3) 06/20/2017       Next Steps:   Patient has upcoming appointment, these items will be addressed at that time.    Type of outreach:    Chart review performed, no outreach needed.      Questions for provider review:    None           Latasha Ballesteros MA

## 2023-06-22 ENCOUNTER — OFFICE VISIT (OUTPATIENT)
Dept: FAMILY MEDICINE | Facility: CLINIC | Age: 88
End: 2023-06-22
Payer: COMMERCIAL

## 2023-06-22 VITALS
TEMPERATURE: 98.1 F | SYSTOLIC BLOOD PRESSURE: 150 MMHG | WEIGHT: 142 LBS | HEART RATE: 99 BPM | OXYGEN SATURATION: 99 % | RESPIRATION RATE: 20 BRPM | DIASTOLIC BLOOD PRESSURE: 100 MMHG | BODY MASS INDEX: 21.03 KG/M2 | HEIGHT: 69 IN

## 2023-06-22 DIAGNOSIS — Z46.1 HEARING AID FITTING OR ADJUSTMENT: ICD-10-CM

## 2023-06-22 DIAGNOSIS — E78.5 HYPERLIPIDEMIA LDL GOAL <130: ICD-10-CM

## 2023-06-22 DIAGNOSIS — R73.02 IMPAIRED GLUCOSE TOLERANCE: ICD-10-CM

## 2023-06-22 DIAGNOSIS — I10 HYPERTENSION GOAL BP (BLOOD PRESSURE) < 140/90: ICD-10-CM

## 2023-06-22 DIAGNOSIS — R41.3 MEMORY LOSS: ICD-10-CM

## 2023-06-22 DIAGNOSIS — Z01.00 VISIT FOR EYE AND VISION EXAM: ICD-10-CM

## 2023-06-22 DIAGNOSIS — E46 PROTEIN-CALORIE MALNUTRITION, UNSPECIFIED SEVERITY (H): ICD-10-CM

## 2023-06-22 DIAGNOSIS — Z00.00 ROUTINE GENERAL MEDICAL EXAMINATION AT A HEALTH CARE FACILITY: Primary | ICD-10-CM

## 2023-06-22 DIAGNOSIS — M17.0 PRIMARY OSTEOARTHRITIS OF BOTH KNEES: ICD-10-CM

## 2023-06-22 LAB
ALBUMIN SERPL BCG-MCNC: 4.4 G/DL (ref 3.5–5.2)
ALP SERPL-CCNC: 84 U/L (ref 35–104)
ALT SERPL W P-5'-P-CCNC: 9 U/L (ref 0–50)
ANION GAP SERPL CALCULATED.3IONS-SCNC: 17 MMOL/L (ref 7–15)
AST SERPL W P-5'-P-CCNC: 24 U/L (ref 0–45)
BILIRUB SERPL-MCNC: 1.9 MG/DL
BUN SERPL-MCNC: 22.4 MG/DL (ref 8–23)
CALCIUM SERPL-MCNC: 10 MG/DL (ref 8.8–10.2)
CHLORIDE SERPL-SCNC: 101 MMOL/L (ref 98–107)
CHOLEST SERPL-MCNC: 234 MG/DL
CREAT SERPL-MCNC: 1.17 MG/DL (ref 0.51–0.95)
DEPRECATED HCO3 PLAS-SCNC: 24 MMOL/L (ref 22–29)
ERYTHROCYTE [DISTWIDTH] IN BLOOD BY AUTOMATED COUNT: 13.9 % (ref 10–15)
GFR SERPL CREATININE-BSD FRML MDRD: 46 ML/MIN/1.73M2
GLUCOSE SERPL-MCNC: 111 MG/DL (ref 70–99)
HBA1C MFR BLD: 5.5 % (ref 0–5.6)
HCT VFR BLD AUTO: 40.1 % (ref 35–47)
HDLC SERPL-MCNC: 61 MG/DL
HGB BLD-MCNC: 12.7 G/DL (ref 11.7–15.7)
LDLC SERPL CALC-MCNC: 149 MG/DL
MCH RBC QN AUTO: 27 PG (ref 26.5–33)
MCHC RBC AUTO-ENTMCNC: 31.7 G/DL (ref 31.5–36.5)
MCV RBC AUTO: 85 FL (ref 78–100)
NONHDLC SERPL-MCNC: 173 MG/DL
PLATELET # BLD AUTO: 166 10E3/UL (ref 150–450)
POTASSIUM SERPL-SCNC: 3.6 MMOL/L (ref 3.4–5.3)
PROT SERPL-MCNC: 8.4 G/DL (ref 6.4–8.3)
RBC # BLD AUTO: 4.7 10E6/UL (ref 3.8–5.2)
SODIUM SERPL-SCNC: 142 MMOL/L (ref 136–145)
TRIGL SERPL-MCNC: 121 MG/DL
WBC # BLD AUTO: 4.1 10E3/UL (ref 4–11)

## 2023-06-22 PROCEDURE — G0439 PPPS, SUBSEQ VISIT: HCPCS | Performed by: PHYSICIAN ASSISTANT

## 2023-06-22 PROCEDURE — 36415 COLL VENOUS BLD VENIPUNCTURE: CPT | Performed by: PHYSICIAN ASSISTANT

## 2023-06-22 PROCEDURE — 80053 COMPREHEN METABOLIC PANEL: CPT | Performed by: PHYSICIAN ASSISTANT

## 2023-06-22 PROCEDURE — 85027 COMPLETE CBC AUTOMATED: CPT | Performed by: PHYSICIAN ASSISTANT

## 2023-06-22 PROCEDURE — 80061 LIPID PANEL: CPT | Performed by: PHYSICIAN ASSISTANT

## 2023-06-22 PROCEDURE — 83036 HEMOGLOBIN GLYCOSYLATED A1C: CPT | Performed by: PHYSICIAN ASSISTANT

## 2023-06-22 PROCEDURE — 99214 OFFICE O/P EST MOD 30 MIN: CPT | Mod: 25 | Performed by: PHYSICIAN ASSISTANT

## 2023-06-22 RX ORDER — MULTIVITAMIN WITH IRON
100 TABLET ORAL DAILY
Qty: 90 TABLET | Refills: 3 | Status: SHIPPED | OUTPATIENT
Start: 2023-06-22

## 2023-06-22 RX ORDER — MULTIVIT WITH MINERALS/LUTEIN
1 TABLET ORAL DAILY
Qty: 90 TABLET | Refills: 3 | Status: SHIPPED | OUTPATIENT
Start: 2023-06-22

## 2023-06-22 RX ORDER — AMLODIPINE BESYLATE 5 MG/1
5 TABLET ORAL DAILY
Qty: 90 TABLET | Refills: 3 | Status: SHIPPED | OUTPATIENT
Start: 2023-06-22

## 2023-06-22 RX ORDER — LISINOPRIL 20 MG/1
20 TABLET ORAL DAILY
Qty: 90 TABLET | Refills: 3 | Status: SHIPPED | OUTPATIENT
Start: 2023-06-22

## 2023-06-22 ASSESSMENT — PAIN SCALES - GENERAL: PAINLEVEL: MILD PAIN (3)

## 2023-06-22 NOTE — PROGRESS NOTES
SUBJECTIVE:   CC: Mya is an 87 year old who presents for preventive health visit.       6/22/2023    10:48 AM   Additional Questions   Roomed by chantell dillard   Accompanied by family         6/22/2023    10:48 AM   Patient Reported Additional Medications   Patient reports taking the following new medications none     HPI            Hypertension Follow-up  Patient has been out of BP meds for 12 months     Do you check your blood pressure regularly outside of the clinic? Yes     Are you following a low salt diet? Yes    Are your blood pressures ever more than 140 on the top number (systolic) OR more   than 90 on the bottom number (diastolic), for example 140/90? Yes      How many servings of fruits and vegetables do you eat daily?  0-1    On average, how many sweetened beverages do you drink each day (Examples: soda, juice, sweet tea, etc.  Do NOT count diet or artificially sweetened beverages)?   0    How many days per week do you exercise enough to make your heart beat faster? 3 or less    How many minutes a day do you exercise enough to make your heart beat faster? 9 or less  How many days per week do you miss taking your medication? 2    What makes it hard for you to take your medications?  remembering to take    Concern - bilateral knee pain due to OA and instability  Onset: chronic  Description: over use during young years. Patient was a professional  and golpher  Intensity: mild, moderate  Progression of Symptoms:  same  Accompanying Signs & Symptoms: none  Previous history of similar problem: yes its a chronic issue  Precipitating factors:        Worsened by: walking down the stairs  Alleviating factors:        Improved by: braces and rest  Therapies tried and outcome: knee sleeves give stability    Use of walker justified due to severe OA and mobility issues. Patient has hard time getting up form a chair     Concern:   Patient's niece wants Life alert, meals on wheels and home assistance  for the patient due to limited mobility and memory issues. Patient is unable to bathe on her own, cook or warm up meals      Have you ever done Advance Care Planning? (For example, a Health Directive, POLST, or a discussion with a medical provider or your loved ones about your wishes): No, advance care planning information given to patient to review.  Patient plans to discuss their wishes with loved ones or provider.      Social History     Tobacco Use     Smoking status: Never     Smokeless tobacco: Never   Substance Use Topics     Alcohol use: Yes     Comment: 0-1 drinks per week              No data to display              Reviewed orders with patient.  Reviewed health maintenance and updated orders accordingly - Yes  Patient Active Problem List   Diagnosis     Obesity     Impaired glucose tolerance     Hypertension goal BP (blood pressure) < 140/90     Hyperlipidemia LDL goal <130     Advanced directives, counseling/discussion     Primary osteoarthritis of right knee     Slow transit constipation     Past Surgical History:   Procedure Laterality Date     COLONOSCOPY  2010    due 2013     HYSTERECTOMY, PAP NO LONGER INDICATED       SURGICAL HISTORY OF -       [facility] NONSPECIFIC PROCEDURE  2003    flex sig       Social History     Tobacco Use     Smoking status: Never     Smokeless tobacco: Never   Substance Use Topics     Alcohol use: Yes     Comment: 0-1 drinks per week     Family History   Problem Relation Age of Onset     Diabetes Father         type 2     Hypertension Father      Coronary Artery Disease No family hx of      Breast Cancer No family hx of      Colon Cancer No family hx of      Thyroid Disease No family hx of      Depression No family hx of      Anxiety Disorder No family hx of          Current Outpatient Medications   Medication Sig Dispense Refill     amLODIPine (NORVASC) 5 MG tablet Take 1 tablet (5 mg) by mouth daily 90 tablet 3     CALCIUM 600 + D 600-200 MG-UNIT OR TABS 1 daily 3  "MONTHS 1 YEAR     lisinopril (ZESTRIL) 20 MG tablet Take 1 tablet (20 mg) by mouth daily 90 tablet 3     multivitamin (CENTRUM SILVER) tablet Take 1 tablet by mouth daily 90 tablet 3     vitamin B6 (PYRIDOXINE) 100 MG tablet Take 1 tablet (100 mg) by mouth daily 90 tablet 3     Allergies   Allergen Reactions     Nkda [No Known Drug Allergy]        Breast Cancer Screening:    Mammogram Screening - Patient over age 75, has elected to discontinue screenings.  Pertinent mammograms are reviewed under the imaging tab.    History of abnormal Pap smear: NO - age 65 - see link Cervical Cytology Screening Guidelines     Reviewed and updated as needed this visit by clinical staff   Tobacco  Allergies  Meds  Problems  Med Hx  Surg Hx  Fam Hx          Reviewed and updated as needed this visit by Provider   Tobacco  Allergies  Meds  Problems  Med Hx  Surg Hx  Fam Hx             Review of Systems  CONSTITUTIONAL: NEGATIVE for fever, chills, change in weight  INTEGUMENTARY/SKIN: NEGATIVE for worrisome rashes, moles or lesions  EYES: NEGATIVE for vision changes or irritation  ENT: NEGATIVE for ear, mouth and throat problems  RESP: NEGATIVE for significant cough or SOB  BREAST: NEGATIVE for masses, tenderness or discharge  CV: NEGATIVE for chest pain, palpitations or peripheral edema  GI: NEGATIVE for nausea, abdominal pain, heartburn, or change in bowel habits  : NEGATIVE for unusual urinary or vaginal symptoms. No vaginal bleeding.  MUSCULOSKELETAL: NEGATIVE for significant arthralgias or myalgia  NEURO: NEGATIVE for weakness, dizziness or paresthesias  PSYCHIATRIC: NEGATIVE for changes in mood or affect      OBJECTIVE:   BP (!) 150/100   Pulse 99   Temp 98.1  F (36.7  C) (Oral)   Resp 20   Ht 1.74 m (5' 8.5\")   Wt 64.4 kg (142 lb)   LMP 07/05/1990   SpO2 99%   BMI 21.28 kg/m    Physical Exam  GENERAL APPEARANCE: healthy, alert and no distress  EYES: Eyes grossly normal to inspection, PERRL and conjunctivae " and sclerae normal  HENT: ear canals and TM's normal, nose and mouth without ulcers or lesions, oropharynx clear and oral mucous membranes moist  NECK: no adenopathy, no asymmetry, masses, or scars and thyroid normal to palpation  RESP: lungs clear to auscultation - no rales, rhonchi or wheezes  BREAST: normal without masses, tenderness or nipple discharge and no palpable axillary masses or adenopathy  CV: regular rate and rhythm, normal S1 S2, no S3 or S4, no murmur, click or rub, no peripheral edema and peripheral pulses strong  ABDOMEN: soft, nontender, no hepatosplenomegaly, no masses and bowel sounds normal  MS: no musculoskeletal defects are noted and gait is age appropriate without ataxia  SKIN: no suspicious lesions or rashes  NEURO: Normal strength and tone, sensory exam grossly normal, mentation intact and speech normal  PSYCH: mentation appears normal and affect normal/bright    Diagnostic Test Results:  Labs reviewed in Epic    ASSESSMENT/PLAN:   Mya was seen today for hypertension and recheck medication.    Diagnoses and all orders for this visit:    Routine general medical examination at a health care facility    Hypertension goal BP (blood pressure) < 140/90  -     Cancel: BASIC METABOLIC PANEL; Future  -     lisinopril (ZESTRIL) 20 MG tablet; Take 1 tablet (20 mg) by mouth daily  -     amLODIPine (NORVASC) 5 MG tablet; Take 1 tablet (5 mg) by mouth daily  -     Primary Care - Care Coordination Referral; Future  -     CBC with platelets; Future  -     Comprehensive metabolic panel (BMP + Alb, Alk Phos, ALT, AST, Total. Bili, TP); Future  -     Albumin Random Urine Quantitative with Creat Ratio; Future  -     CBC with platelets  -     Comprehensive metabolic panel (BMP + Alb, Alk Phos, ALT, AST, Total. Bili, TP)  -     Albumin Random Urine Quantitative with Creat Ratio    Impaired glucose tolerance  -     HEMOGLOBIN A1C; Future  -     HEMOGLOBIN A1C    Memory loss  -     vitamin B6 (PYRIDOXINE)  100 MG tablet; Take 1 tablet (100 mg) by mouth daily  -     Adult Neurology  Referral; Future  -     Primary Care - Care Coordination Referral; Future  -     UA with Microscopic - lab collect; Future  -     UA with Microscopic - lab collect    Hyperlipidemia LDL goal <130  -     Primary Care - Care Coordination Referral; Future  -     Lipid Profile (Chol, Trig, HDL, LDL calc); Future  -     Comprehensive metabolic panel (BMP + Alb, Alk Phos, ALT, AST, Total. Bili, TP); Future  -     Lipid Profile (Chol, Trig, HDL, LDL calc)  -     Comprehensive metabolic panel (BMP + Alb, Alk Phos, ALT, AST, Total. Bili, TP)    Primary osteoarthritis of both knees  -     Primary Care - Care Coordination Referral; Future  -     XR Knee Bilateral 3 Views; Future  -     Knee Supplies Order Knee Sleeve/Brace; Bilateral; Open  -     Walker Order for DME - ONLY FOR DME    Hearing aid fitting or adjustment  -     Adult Audiology  Referral; Future    Visit for eye and vision exam  -     Adult Eye  Referral; Future    Protein-calorie malnutrition, unspecified severity (H)  -     multivitamin (CENTRUM SILVER) tablet; Take 1 tablet by mouth daily    Other orders  -     REVIEW OF HEALTH MAINTENANCE PROTOCOL ORDERS      Wear knee braces   Tylenol for pain as needed   Use walker-prescription was provided       HTN-uncontrolled, patient ran out of medications over 6 months ago  Restart Lisinopril 20 mg daily and Amlodipine 5 mg daily for BP    Make appointment with neurology to address memory issues     Care coordination order was placed to discuss safety, living situation      Patient has been advised of split billing requirements and indicates understanding: Yes      COUNSELING:  Reviewed preventive health counseling, as reflected in patient instructions       Regular exercise       Healthy diet/nutrition        She reports that she has never smoked. She has never used smokeless tobacco.      Eula Lewis,  SHAHNAZ  [facility]

## 2023-06-22 NOTE — PATIENT INSTRUCTIONS
At [facility], we strive to deliver an exceptional experience to you, every time we see you. If you receive a survey, please complete it as we do value your feedback.  If you have MyChart, you can expect to receive results automatically within 24 hours of their completion.  Your provider will send a note interpreting your results as well.   If you do not have MyChart, you should receive your results in about a week by mail.    Your care team:                            Family Medicine Internal Medicine   MD Claudio Akers MD Shantel Branch-Fleming, MD Srinivasa Vaka, MD Katya Belousova, ORLIN Nicole CNP, MD ([facility]   Mateo Braun, MD Crystal Blount MD Amelia Massimini APRN CNP   Mirna Brown APRN CNP MD Bryon Chiu MD          Clinic hours: Monday - Thursday 7 am-6 pm; Fridays 7 am-5 pm.   Urgent care: Monday - Friday 10 am- 8 pm; Saturday and Sunday 9 am-5 pm.    Clinic: [phone]       [facility]: Monday - Thursday 8 am - 7 pm; Friday 8 am - 6 pm  [facility]: [phone]     Preventive Health Recommendations    See your health care provider every year to    Review health changes.     Discuss preventive care.      Review your medicines if your doctor has prescribed any.      You no longer need a yearly Pap test unless you've had an abnormal Pap test in the past 10 years. If you have vaginal symptoms, such as bleeding or discharge, be sure to talk with your provider about a Pap test.      Every 1 to 2 years, have a mammogram.  If you are over 69, talk with your health care provider about whether or not you want to continue having screening mammograms.      Every 10 years, have a colonoscopy. Or, have a yearly FIT test (stool test). These exams will check for colon cancer.       Have a cholesterol test every 5  years, or more often if your doctor advises it.       Have a diabetes test (fasting glucose) every three years. If you are at risk for diabetes, you should have this test more often.       At age 65, have a bone density scan (DEXA) to check for osteoporosis (brittle bone disease).    Shots:    Get a flu shot each year.    Get a tetanus shot every 10 years.    Talk to your doctor about your pneumonia vaccines. There are now two you should receive - Pneumovax (PPSV 23) and Prevnar (PCV 13).    Talk to your pharmacist about the shingles vaccine.    Talk to your doctor about the hepatitis B vaccine.    Nutrition:     Eat at least 5 servings of fruits and vegetables each day.      Eat whole-grain bread, whole-wheat pasta and brown rice instead of white grains and rice.      Get adequate about Calcium and Vitamin D.     Lifestyle    Exercise at least 150 minutes a week (30 minutes a day, 5 days a week). This will help you control your weight and prevent disease.      Limit alcohol to one drink per day.      No smoking.       Wear sunscreen to prevent skin cancer.       See your dentist twice a year for an exam and cleaning.      See your eye doctor every 1 to 2 years to screen for conditions such as glaucoma, macular degeneration, cataracts, etc.    Personalized Prevention Plan  You are due for the preventive services outlined below.  Your care team is available to assist you in scheduling these services.  If you have already completed any of these items, please share that information with your care team to update in your medical record.    Health Maintenance Due   Topic Date Due     COVID-19 Vaccine (1) Never done     Diptheria Tetanus Pertussis (DTAP/TDAP/TD) Vaccine (1 - Tdap) 07/06/2010     Zoster (Shingles) Vaccine (2 of 3) 06/20/2017     Discuss Advance Care Planning  12/28/2017     Annual Wellness Visit  10/10/2019     Basic Metabolic Panel  02/08/2021     A1C Lab  08/08/2021     FALL RISK ASSESSMENT  05/18/2022      PHQ-2 (once per calendar year)  01/01/2023

## 2023-06-23 ENCOUNTER — PATIENT OUTREACH (OUTPATIENT)
Dept: CARE COORDINATION | Facility: CLINIC | Age: 88
End: 2023-06-23
Payer: COMMERCIAL

## 2023-06-23 NOTE — LETTER
[facility]ORDIN[facility]  [address]  [place] [address]    June 26, 2023    Mya Mack  [address]  [facility] [place] [address]      Dear Mya,        I am a  clinic community health worker who works with Loretta Lopez MD with the [facility]. I have been trying to reach you recently to introduce Clinic Care Coordination. Below is a description of clinic care coordination and how I can further assist you.       The clinic care coordination team is made up of a registered nurse, , financial resource worker and community health worker who understand the health care system. The goal of clinic care coordination is to help you manage your health and improve access to the health care system. Our team works alongside your provider to assist you in determining your health and social needs. We can help you obtain health care and community resources, providing you with necessary information and education. We can work with you through any barriers and develop a care plan that helps coordinate and strengthen the communication between you and your care team.  Our services are voluntary and are offered without charge to you personally.    Please feel free to contact me with any questions or concerns regarding care coordination and what we can offer.      We are focused on providing you with the highest-quality healthcare experience possible.    Sincerely,     TREVIN Lynn, [place], Ronal Pe[place]ock [place] and [facility]  [phone]

## 2023-06-23 NOTE — PROGRESS NOTES
[facility]/Voicemail       Clinical Data: Care Coordinator Outreach  Outreach attempted x 1. Voicemail full, unable to leave a message  Plan:   Care Coordinator will try to reach patient again in 1-2 business days.    TREVIN Lynn [place], [place], [place], [place] and [facility]  [phone]

## 2023-06-26 NOTE — PROGRESS NOTES
[facility]       Clinical Data: Care Coordinator Outreach  Outreach attempted x 2. VM full, unable to leave a message   Plan: Care Coordinator will send care coordination introduction letter with care coordinator contact information and explanation of care coordination services via mail. Care Coordinator will do no further outreaches at this time.    Samantha Steele, TREVIN [place], [place], [place] and [facility]  [phone]

## 2024-07-08 ENCOUNTER — TELEPHONE (OUTPATIENT)
Dept: FAMILY MEDICINE | Facility: CLINIC | Age: 89
End: 2024-07-08
Payer: COMMERCIAL

## 2024-07-08 NOTE — LETTER
July 8, 2024    Mya Mack  [address]  [facility] [address]    Dear Mya Mack,     At [facility] we care about your health and are committed to providing quality patient care.    Which includes staying current on preventive cancer screenings.  You can increase your chances of finding and treating cancers through regular screenings.      Our records indicate you may be due for the following preventive screening(s):  Breast Cancer Screening - Mammogram  Physical Annual Wellness Visit      Topic Date Due    Diptheria Tetanus Pertussis (DTAP/TDAP/TD) Vaccine (1 - Tdap) 07/06/2010    Zoster (Shingles) Vaccine (2 of 3) 06/20/2017    COVID-19 Vaccine (1 - 2023-24 season) Never done   Breast Cancer Screening - Mammogram  Physical Annual Wellness Visit    To schedule an appointment or discuss this screening further, you may contact us by phone at the [facility] at [phone] or online through the patient portal/[email or web address] @ [email or web address]    If you have had any of the screenings listed above at another facility, please call us so that we may update your chart.      Your partners in health,      Quality Committee at [facility]

## 2024-07-08 NOTE — TELEPHONE ENCOUNTER
Patient Quality Outreach    Patient is due for the following:   Breast Cancer Screening - Mammogram  Physical Annual Wellness Visit      Topic Date Due    Diptheria Tetanus Pertussis (DTAP/TDAP/TD) Vaccine (1 - Tdap) 07/06/2010    Zoster (Shingles) Vaccine (2 of 3) 06/20/2017    COVID-19 Vaccine (1 - 2023-24 season) Never done       Next Steps:   Schedule a Annual Wellness Visit    Type of outreach:    Sent letter.      Questions for provider review:    None           Latasha Ballesteros MA

## 2025-01-21 ENCOUNTER — LAB REQUISITION (OUTPATIENT)
Dept: LAB | Facility: CLINIC | Age: 89
End: 2025-01-21
Payer: MEDICARE

## 2025-01-21 DIAGNOSIS — I16.0 HYPERTENSIVE URGENCY: ICD-10-CM

## 2025-01-21 DIAGNOSIS — E55.9 VITAMIN D DEFICIENCY, UNSPECIFIED: ICD-10-CM

## 2025-01-21 DIAGNOSIS — F02.80 DEMENTIA IN OTHER DISEASES CLASSIFIED ELSEWHERE, UNSPECIFIED SEVERITY, WITHOUT BEHAVIORAL DISTURBANCE, PSYCHOTIC DISTURBANCE, MOOD DISTURBANCE, AND ANXIETY (H): ICD-10-CM

## 2025-01-21 DIAGNOSIS — E44.0 MODERATE PROTEIN-CALORIE MALNUTRITION: ICD-10-CM

## 2025-01-21 DIAGNOSIS — G89.29 OTHER CHRONIC PAIN: ICD-10-CM

## 2025-01-29 LAB
ALBUMIN SERPL BCG-MCNC: 3.8 G/DL (ref 3.5–5.2)
ALP SERPL-CCNC: 100 U/L (ref 40–150)
ALT SERPL W P-5'-P-CCNC: 18 U/L (ref 0–50)
ANION GAP SERPL CALCULATED.3IONS-SCNC: 10 MMOL/L (ref 7–15)
AST SERPL W P-5'-P-CCNC: 28 U/L (ref 0–45)
BILIRUB SERPL-MCNC: 0.9 MG/DL
BUN SERPL-MCNC: 12.7 MG/DL (ref 8–23)
CALCIUM SERPL-MCNC: 10.2 MG/DL (ref 8.8–10.4)
CHLORIDE SERPL-SCNC: 104 MMOL/L (ref 98–107)
CREAT SERPL-MCNC: 0.72 MG/DL (ref 0.51–0.95)
EGFRCR SERPLBLD CKD-EPI 2021: 80 ML/MIN/1.73M2
ERYTHROCYTE [DISTWIDTH] IN BLOOD BY AUTOMATED COUNT: 14.3 % (ref 10–15)
GLUCOSE SERPL-MCNC: 84 MG/DL (ref 70–99)
HCO3 SERPL-SCNC: 26 MMOL/L (ref 22–29)
HCT VFR BLD AUTO: 35.6 % (ref 35–47)
HGB BLD-MCNC: 10.8 G/DL (ref 11.7–15.7)
MCH RBC QN AUTO: 27.5 PG (ref 26.5–33)
MCHC RBC AUTO-ENTMCNC: 30.3 G/DL (ref 31.5–36.5)
MCV RBC AUTO: 91 FL (ref 78–100)
PLATELET # BLD AUTO: 213 10E3/UL (ref 150–450)
POTASSIUM SERPL-SCNC: 4.2 MMOL/L (ref 3.4–5.3)
PROT SERPL-MCNC: 7.5 G/DL (ref 6.4–8.3)
RBC # BLD AUTO: 3.93 10E6/UL (ref 3.8–5.2)
SODIUM SERPL-SCNC: 140 MMOL/L (ref 135–145)
VIT D+METAB SERPL-MCNC: 13 NG/ML (ref 20–50)
WBC # BLD AUTO: 3.7 10E3/UL (ref 4–11)

## 2025-01-29 PROCEDURE — 36415 COLL VENOUS BLD VENIPUNCTURE: CPT | Mod: ORL | Performed by: PHYSICIAN ASSISTANT

## 2025-01-29 PROCEDURE — P9603 ONE-WAY ALLOW PRORATED MILES: HCPCS | Mod: ORL | Performed by: PHYSICIAN ASSISTANT

## 2025-01-29 PROCEDURE — 82306 VITAMIN D 25 HYDROXY: CPT | Mod: ORL | Performed by: PHYSICIAN ASSISTANT

## 2025-01-29 PROCEDURE — 85027 COMPLETE CBC AUTOMATED: CPT | Mod: ORL | Performed by: PHYSICIAN ASSISTANT

## 2025-01-29 PROCEDURE — 80053 COMPREHEN METABOLIC PANEL: CPT | Mod: ORL | Performed by: PHYSICIAN ASSISTANT

## 2025-04-03 ENCOUNTER — TELEPHONE (OUTPATIENT)
Dept: FAMILY MEDICINE | Facility: CLINIC | Age: 89
End: 2025-04-03
Payer: MEDICARE

## 2025-04-03 NOTE — LETTER
Mya Mack  [address]  [facility] [address]    Dear Mya,    At [facility] we care about your health and are committed to providing quality patient care.     Here is a list of Health Maintenance topics that are due now or due soon:  Health Maintenance Due   Topic Date Due    DTAP/TDAP/TD IMMUNIZATION (1 - Tdap) 07/06/2010    RSV VACCINE (1 - 1-dose 75+ series) Never done    ZOSTER IMMUNIZATION (2 of 3) 06/20/2017    FALL RISK ASSESSMENT  05/18/2022    BMP  12/22/2023    MEDICARE ANNUAL WELLNESS VISIT  06/22/2024    A1C  06/22/2024    LIPID  06/22/2024    ANNUAL REVIEW OF HM ORDERS  06/22/2024    INFLUENZA VACCINE (1) 09/01/2024    COVID-19 Vaccine (1 - 2024-25 season) Never done    PHQ-2 (once per calendar year)  01/01/2025        We are recommending that you:  Schedule a WELLNESS (Preventative/Physical) APPOINTMENT with your primary care provider. If you go elsewhere for your wellness appointments then please disregard this reminder    To schedule an appointment or discuss this further, you may contact us by phone at the [facility] at [phone] or online through the patient portal/[email or web address] @ [email or web address]    Thank you for trusting [facility] and we appreciate the opportunity to serve you.  We look forward to supporting your healthcare needs in the future.    Your partners in health,      Quality Committee at [facility]

## 2025-04-03 NOTE — TELEPHONE ENCOUNTER
Patient Quality Outreach    Patient is due for the following:   Physical Annual Wellness Visit      Topic Date Due    Diptheria Tetanus Pertussis (DTAP/TDAP/TD) Vaccine (1 - Tdap) 07/06/2010    Zoster (Shingles) Vaccine (2 of 3) 06/20/2017    Flu Vaccine (1) 09/01/2024    COVID-19 Vaccine (1 - 2024-25 season) Never done       Action(s) Taken:   Schedule a Annual Wellness Visit    Type of outreach:    Sent letter.    Questions for provider review:    None         Kira Fermin MA  Chart routed to None.